# Patient Record
Sex: FEMALE | Race: WHITE
[De-identification: names, ages, dates, MRNs, and addresses within clinical notes are randomized per-mention and may not be internally consistent; named-entity substitution may affect disease eponyms.]

---

## 2017-01-05 ENCOUNTER — HOSPITAL ENCOUNTER (OUTPATIENT)
Dept: HOSPITAL 83 - CARD | Age: 77
Discharge: HOME | End: 2017-01-05
Attending: FAMILY MEDICINE
Payer: MEDICARE

## 2017-01-05 DIAGNOSIS — R01.1: ICD-10-CM

## 2017-01-05 DIAGNOSIS — I05.0: ICD-10-CM

## 2017-01-05 DIAGNOSIS — I34.0: Primary | ICD-10-CM

## 2017-01-05 DIAGNOSIS — R06.02: ICD-10-CM

## 2017-07-21 ENCOUNTER — HOSPITAL ENCOUNTER (OUTPATIENT)
Dept: HOSPITAL 83 - SDC | Age: 77
Discharge: HOME | End: 2017-07-21
Attending: SURGERY
Payer: MEDICARE

## 2017-07-21 VITALS — SYSTOLIC BLOOD PRESSURE: 119 MMHG | DIASTOLIC BLOOD PRESSURE: 59 MMHG

## 2017-07-21 VITALS — WEIGHT: 167 LBS | HEIGHT: 72 IN | BODY MASS INDEX: 22.62 KG/M2

## 2017-07-21 VITALS — DIASTOLIC BLOOD PRESSURE: 58 MMHG

## 2017-07-21 VITALS — DIASTOLIC BLOOD PRESSURE: 87 MMHG | SYSTOLIC BLOOD PRESSURE: 141 MMHG

## 2017-07-21 VITALS — SYSTOLIC BLOOD PRESSURE: 120 MMHG | DIASTOLIC BLOOD PRESSURE: 68 MMHG

## 2017-07-21 VITALS — SYSTOLIC BLOOD PRESSURE: 126 MMHG | DIASTOLIC BLOOD PRESSURE: 86 MMHG

## 2017-07-21 VITALS — DIASTOLIC BLOOD PRESSURE: 63 MMHG

## 2017-07-21 DIAGNOSIS — Z98.890: ICD-10-CM

## 2017-07-21 DIAGNOSIS — Z90.710: ICD-10-CM

## 2017-07-21 DIAGNOSIS — K57.30: ICD-10-CM

## 2017-07-21 DIAGNOSIS — J44.9: ICD-10-CM

## 2017-07-21 DIAGNOSIS — Z87.891: ICD-10-CM

## 2017-07-21 DIAGNOSIS — Z09: Primary | ICD-10-CM

## 2017-07-21 DIAGNOSIS — Z86.010: ICD-10-CM

## 2017-07-21 PROCEDURE — 00810: CPT

## 2017-09-28 ENCOUNTER — HOSPITAL ENCOUNTER (EMERGENCY)
Dept: HOSPITAL 83 - ED | Age: 77
Discharge: HOME | End: 2017-09-28
Payer: MEDICARE

## 2017-09-28 VITALS — WEIGHT: 163 LBS | HEIGHT: 62.99 IN | BODY MASS INDEX: 28.88 KG/M2

## 2017-09-28 DIAGNOSIS — Z87.891: ICD-10-CM

## 2017-09-28 DIAGNOSIS — Z91.040: ICD-10-CM

## 2017-09-28 DIAGNOSIS — Z88.7: ICD-10-CM

## 2017-09-28 DIAGNOSIS — R33.9: Primary | ICD-10-CM

## 2017-09-28 DIAGNOSIS — Z79.899: ICD-10-CM

## 2017-12-04 ENCOUNTER — HOSPITAL ENCOUNTER (OUTPATIENT)
Dept: HOSPITAL 83 - RAD | Age: 77
Discharge: HOME | End: 2017-12-04
Attending: FAMILY MEDICINE
Payer: MEDICARE

## 2017-12-04 DIAGNOSIS — J44.9: Primary | ICD-10-CM

## 2017-12-04 DIAGNOSIS — I10: ICD-10-CM

## 2018-02-09 ENCOUNTER — HOSPITAL ENCOUNTER (OUTPATIENT)
Dept: HOSPITAL 83 - RAD | Age: 78
Discharge: HOME | End: 2018-02-09
Attending: FAMILY MEDICINE
Payer: MEDICARE

## 2018-02-09 DIAGNOSIS — R00.0: Primary | ICD-10-CM

## 2018-02-09 DIAGNOSIS — R05: ICD-10-CM

## 2018-02-09 DIAGNOSIS — R06.02: ICD-10-CM

## 2018-03-14 ENCOUNTER — HOSPITAL ENCOUNTER (OUTPATIENT)
Dept: HOSPITAL 83 - CP | Age: 78
Discharge: HOME | End: 2018-03-14
Attending: FAMILY MEDICINE
Payer: MEDICARE

## 2018-03-14 DIAGNOSIS — J42: Primary | ICD-10-CM

## 2018-07-24 ENCOUNTER — HOSPITAL ENCOUNTER (EMERGENCY)
Dept: HOSPITAL 83 - ED | Age: 78
Discharge: LEFT BEFORE DISCHARGE | End: 2018-07-24
Payer: MEDICARE

## 2018-07-24 VITALS — WEIGHT: 160 LBS | HEIGHT: 65.98 IN | BODY MASS INDEX: 25.71 KG/M2

## 2018-07-24 DIAGNOSIS — Z87.891: ICD-10-CM

## 2018-07-24 DIAGNOSIS — S00.03XA: ICD-10-CM

## 2018-07-24 DIAGNOSIS — Z79.899: ICD-10-CM

## 2018-07-24 DIAGNOSIS — S51.811A: Primary | ICD-10-CM

## 2018-07-24 DIAGNOSIS — R55: ICD-10-CM
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
## 2018-07-24 DIAGNOSIS — M19.90: ICD-10-CM

## 2018-07-24 DIAGNOSIS — N39.0: ICD-10-CM

## 2018-07-24 DIAGNOSIS — Z88.7: ICD-10-CM

## 2018-07-24 DIAGNOSIS — E66.9: ICD-10-CM

## 2018-07-24 DIAGNOSIS — Z91.040: ICD-10-CM

## 2018-07-24 DIAGNOSIS — J44.9: ICD-10-CM

## 2018-07-24 DIAGNOSIS — K21.9: ICD-10-CM
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
## 2018-07-24 DIAGNOSIS — Z86.73: ICD-10-CM

## 2018-07-24 DIAGNOSIS — I10: ICD-10-CM
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
## 2018-07-24 LAB
ALBUMIN SERPL-MCNC: 4.1 GM/DL (ref 3.1–4.5)
ALP SERPL-CCNC: 112 U/L (ref 45–117)
ALT SERPL W P-5'-P-CCNC: 29 U/L (ref 12–78)
APPEARANCE UR: (no result)
APTT PPP: 23.2 SECONDS (ref 20.8–31.5)
AST SERPL-CCNC: 36 IU/L (ref 3–35)
BACTERIA #/AREA URNS HPF: (no result) /[HPF]
BASOPHILS # BLD AUTO: 0.1 10*3/UL (ref 0–0.1)
BASOPHILS NFR BLD AUTO: 0.8 % (ref 0–1)
BILIRUB UR QL STRIP: NEGATIVE
BUN SERPL-MCNC: 24 MG/DL (ref 7–24)
CHLORIDE SERPL-SCNC: 110 MMOL/L (ref 98–107)
CK SERPL-CCNC: 590 U/L (ref 26–192)
COLOR UR: YELLOW
CREAT SERPL-MCNC: 1.1 MG/DL (ref 0.55–1.02)
EOSINOPHIL # BLD AUTO: 0.2 10*3/UL (ref 0–0.4)
EOSINOPHIL # BLD AUTO: 2.1 % (ref 1–4)
EPI CELLS #/AREA URNS HPF: (no result) /[HPF]
ERYTHROCYTE [DISTWIDTH] IN BLOOD BY AUTOMATED COUNT: 17.2 % (ref 0–14.5)
GLUCOSE UR QL: NEGATIVE
HCT VFR BLD AUTO: 41.9 % (ref 37–47)
HGB BLD-MCNC: 12.9 G/DL (ref 12–16)
HGB UR QL STRIP: (no result)
INR BLD: 1 (ref 2–3.5)
KETONES UR QL STRIP: (no result)
LEUKOCYTE ESTERASE UR QL STRIP: (no result)
LIPASE SERPL-CCNC: 118 U/L (ref 73–393)
LYMPHOCYTES # BLD AUTO: 1.5 10*3/UL (ref 1.3–4.4)
LYMPHOCYTES NFR BLD AUTO: 17.1 % (ref 27–41)
MCH RBC QN AUTO: 26.9 PG (ref 27–31)
MCHC RBC AUTO-ENTMCNC: 30.8 G/DL (ref 33–37)
MCV RBC AUTO: 87.5 FL (ref 81–99)
MONOCYTES # BLD AUTO: 0.7 10*3/UL (ref 0.1–1)
MONOCYTES NFR BLD MANUAL: 7.9 % (ref 3–9)
MYOGLOBIN SERPL-MCNC: 217 NG/ML (ref 13–71)
NEUT #: 6.4 10*3/UL (ref 2.3–7.9)
NEUT %: 71.9 % (ref 47–73)
NITRITE UR QL STRIP: POSITIVE
NRBC BLD QL AUTO: 0 10*3/UL (ref 0–0)
PH UR STRIP: 6.5 [PH] (ref 5–9)
PHOSPHATE SERPL-MCNC: 4 MG/DL (ref 2.5–4.9)
PLATELET # BLD AUTO: 262 10*3/UL (ref 130–400)
PMV BLD AUTO: 9.7 FL (ref 9.6–12.3)
POTASSIUM SERPL-SCNC: 3.8 MMOL/L (ref 3.5–5.1)
PROT SERPL-MCNC: 7.6 GM/DL (ref 6.4–8.2)
RBC # BLD AUTO: 4.79 10*6/UL (ref 4.1–5.1)
RBC #/AREA URNS HPF: (no result) RBC/HPF (ref 0–2)
SODIUM SERPL-SCNC: 145 MMOL/L (ref 136–145)
SP GR UR: 1.02 (ref 1–1.03)
TROPONIN I SERPL-MCNC: 0.05 NG/ML (ref ?–0.04)
UROBILINOGEN UR STRIP-MCNC: 0.2 E.U./DL (ref 0.2–1)
WBC #/AREA URNS HPF: (no result) WBC/HPF (ref 0–5)
WBC NRBC COR # BLD AUTO: 8.9 10*3/UL (ref 4.8–10.8)

## 2018-07-25 ENCOUNTER — HOSPITAL ENCOUNTER (INPATIENT)
Dept: HOSPITAL 83 - ED | Age: 78
LOS: 5 days | Discharge: HOME HEALTH | DRG: 308 | End: 2018-07-30
Attending: INTERNAL MEDICINE | Admitting: INTERNAL MEDICINE
Payer: MEDICARE

## 2018-07-25 VITALS — DIASTOLIC BLOOD PRESSURE: 70 MMHG | SYSTOLIC BLOOD PRESSURE: 100 MMHG

## 2018-07-25 VITALS — WEIGHT: 150 LBS | HEIGHT: 65 IN | BODY MASS INDEX: 24.99 KG/M2

## 2018-07-25 VITALS — DIASTOLIC BLOOD PRESSURE: 70 MMHG

## 2018-07-25 VITALS — SYSTOLIC BLOOD PRESSURE: 114 MMHG | DIASTOLIC BLOOD PRESSURE: 63 MMHG

## 2018-07-25 VITALS — DIASTOLIC BLOOD PRESSURE: 60 MMHG

## 2018-07-25 VITALS — SYSTOLIC BLOOD PRESSURE: 105 MMHG | DIASTOLIC BLOOD PRESSURE: 60 MMHG

## 2018-07-25 VITALS — DIASTOLIC BLOOD PRESSURE: 61 MMHG

## 2018-07-25 VITALS — DIASTOLIC BLOOD PRESSURE: 78 MMHG

## 2018-07-25 VITALS — DIASTOLIC BLOOD PRESSURE: 76 MMHG

## 2018-07-25 VITALS — DIASTOLIC BLOOD PRESSURE: 68 MMHG | SYSTOLIC BLOOD PRESSURE: 92 MMHG

## 2018-07-25 VITALS — DIASTOLIC BLOOD PRESSURE: 54 MMHG | SYSTOLIC BLOOD PRESSURE: 80 MMHG

## 2018-07-25 VITALS — DIASTOLIC BLOOD PRESSURE: 64 MMHG

## 2018-07-25 VITALS — DIASTOLIC BLOOD PRESSURE: 69 MMHG

## 2018-07-25 VITALS — DIASTOLIC BLOOD PRESSURE: 70 MMHG | SYSTOLIC BLOOD PRESSURE: 106 MMHG

## 2018-07-25 VITALS — DIASTOLIC BLOOD PRESSURE: 63 MMHG

## 2018-07-25 VITALS — DIASTOLIC BLOOD PRESSURE: 68 MMHG

## 2018-07-25 DIAGNOSIS — Z79.899: ICD-10-CM

## 2018-07-25 DIAGNOSIS — K50.90: ICD-10-CM

## 2018-07-25 DIAGNOSIS — Z98.41: ICD-10-CM

## 2018-07-25 DIAGNOSIS — Z90.710: ICD-10-CM

## 2018-07-25 DIAGNOSIS — Z91.040: ICD-10-CM

## 2018-07-25 DIAGNOSIS — E87.6: ICD-10-CM

## 2018-07-25 DIAGNOSIS — Z90.721: ICD-10-CM

## 2018-07-25 DIAGNOSIS — I10: ICD-10-CM

## 2018-07-25 DIAGNOSIS — F32.9: ICD-10-CM

## 2018-07-25 DIAGNOSIS — Z79.01: ICD-10-CM

## 2018-07-25 DIAGNOSIS — Z81.8: ICD-10-CM

## 2018-07-25 DIAGNOSIS — Z71.6: ICD-10-CM

## 2018-07-25 DIAGNOSIS — Z98.42: ICD-10-CM

## 2018-07-25 DIAGNOSIS — Z83.6: ICD-10-CM

## 2018-07-25 DIAGNOSIS — I47.1: ICD-10-CM

## 2018-07-25 DIAGNOSIS — Z88.7: ICD-10-CM

## 2018-07-25 DIAGNOSIS — Z82.49: ICD-10-CM

## 2018-07-25 DIAGNOSIS — K21.9: ICD-10-CM

## 2018-07-25 DIAGNOSIS — N32.81: ICD-10-CM

## 2018-07-25 DIAGNOSIS — N17.0: ICD-10-CM

## 2018-07-25 DIAGNOSIS — I48.0: Primary | ICD-10-CM

## 2018-07-25 DIAGNOSIS — M19.90: ICD-10-CM

## 2018-07-25 DIAGNOSIS — Z66: ICD-10-CM

## 2018-07-25 DIAGNOSIS — N39.0: ICD-10-CM

## 2018-07-25 DIAGNOSIS — R74.8: ICD-10-CM

## 2018-07-25 DIAGNOSIS — J44.1: ICD-10-CM

## 2018-07-25 DIAGNOSIS — Z51.5: ICD-10-CM

## 2018-07-25 DIAGNOSIS — E66.9: ICD-10-CM

## 2018-07-25 DIAGNOSIS — I48.92: ICD-10-CM

## 2018-07-25 LAB
ALBUMIN SERPL-MCNC: 3.5 GM/DL (ref 3.1–4.5)
ALP SERPL-CCNC: 92 U/L (ref 45–117)
ALT SERPL W P-5'-P-CCNC: 29 U/L (ref 12–78)
APTT PPP: 23.5 SECONDS (ref 20.8–31.5)
AST SERPL-CCNC: 39 IU/L (ref 3–35)
BASOPHILS # BLD AUTO: 0.1 10*3/UL (ref 0–0.1)
BASOPHILS NFR BLD AUTO: 0.8 % (ref 0–1)
BUN SERPL-MCNC: 26 MG/DL (ref 7–24)
CHLORIDE SERPL-SCNC: 109 MMOL/L (ref 98–107)
CREAT SERPL-MCNC: 1.12 MG/DL (ref 0.55–1.02)
EOSINOPHIL # BLD AUTO: 0.1 10*3/UL (ref 0–0.4)
EOSINOPHIL # BLD AUTO: 1 % (ref 1–4)
ERYTHROCYTE [DISTWIDTH] IN BLOOD BY AUTOMATED COUNT: 17.4 % (ref 0–14.5)
HCT VFR BLD AUTO: 40.3 % (ref 37–47)
HGB BLD-MCNC: 12.5 G/DL (ref 12–16)
INR BLD: 1.1 (ref 2–3.5)
LYMPHOCYTES # BLD AUTO: 1.6 10*3/UL (ref 1.3–4.4)
LYMPHOCYTES NFR BLD AUTO: 19.7 % (ref 27–41)
MCH RBC QN AUTO: 27.2 PG (ref 27–31)
MCHC RBC AUTO-ENTMCNC: 31 G/DL (ref 33–37)
MCV RBC AUTO: 87.8 FL (ref 81–99)
MONOCYTES # BLD AUTO: 0.9 10*3/UL (ref 0.1–1)
MONOCYTES NFR BLD MANUAL: 10.4 % (ref 3–9)
NEUT #: 5.6 10*3/UL (ref 2.3–7.9)
NEUT %: 67.7 % (ref 47–73)
NRBC BLD QL AUTO: 0 % (ref 0–0)
PLATELET # BLD AUTO: 244 10*3/UL (ref 130–400)
PMV BLD AUTO: 9.5 FL (ref 9.6–12.3)
POTASSIUM SERPL-SCNC: 3.4 MMOL/L (ref 3.5–5.1)
PROT SERPL-MCNC: 7 GM/DL (ref 6.4–8.2)
RBC # BLD AUTO: 4.59 10*6/UL (ref 4.1–5.1)
SODIUM SERPL-SCNC: 144 MMOL/L (ref 136–145)
TROPONIN I SERPL-MCNC: 0.09 NG/ML (ref ?–0.04)
TSH SERPL DL<=0.005 MIU/L-ACNC: 2.27 UIU/ML (ref 0.36–4.75)
WBC NRBC COR # BLD AUTO: 8.3 10*3/UL (ref 4.8–10.8)

## 2018-07-26 VITALS — DIASTOLIC BLOOD PRESSURE: 66 MMHG | SYSTOLIC BLOOD PRESSURE: 109 MMHG

## 2018-07-26 VITALS — DIASTOLIC BLOOD PRESSURE: 81 MMHG | SYSTOLIC BLOOD PRESSURE: 138 MMHG

## 2018-07-26 VITALS — SYSTOLIC BLOOD PRESSURE: 94 MMHG | DIASTOLIC BLOOD PRESSURE: 55 MMHG

## 2018-07-26 VITALS — DIASTOLIC BLOOD PRESSURE: 92 MMHG

## 2018-07-26 VITALS — DIASTOLIC BLOOD PRESSURE: 78 MMHG

## 2018-07-26 VITALS — DIASTOLIC BLOOD PRESSURE: 79 MMHG | SYSTOLIC BLOOD PRESSURE: 116 MMHG

## 2018-07-26 LAB
25(OH)D3 SERPL-MCNC: 39.4 NG/ML (ref 30–100)
ALBUMIN SERPL-MCNC: 3.4 GM/DL (ref 3.1–4.5)
ALP SERPL-CCNC: 86 U/L (ref 45–117)
ALT SERPL W P-5'-P-CCNC: 32 U/L (ref 12–78)
APTT PPP: 23.4 SECONDS (ref 20.8–31.5)
AST SERPL-CCNC: 38 IU/L (ref 3–35)
BASOPHILS # BLD AUTO: 0.1 10*3/UL (ref 0–0.1)
BASOPHILS NFR BLD AUTO: 0.8 % (ref 0–1)
BUN SERPL-MCNC: 18 MG/DL (ref 7–24)
CHLORIDE SERPL-SCNC: 111 MMOL/L (ref 98–107)
CHOLEST SERPL-MCNC: 139 MG/DL (ref ?–200)
CREAT SERPL-MCNC: 0.82 MG/DL (ref 0.55–1.02)
EOSINOPHIL # BLD AUTO: 0.1 10*3/UL (ref 0–0.4)
EOSINOPHIL # BLD AUTO: 1.8 % (ref 1–4)
ERYTHROCYTE [DISTWIDTH] IN BLOOD BY AUTOMATED COUNT: 17.2 % (ref 0–14.5)
HCT VFR BLD AUTO: 42.6 % (ref 37–47)
HDLC SERPL-MCNC: 52 MG/DL (ref 40–60)
HGB BLD-MCNC: 12.8 G/DL (ref 12–16)
INR BLD: 1 (ref 2–3.5)
LDLC SERPL DIRECT ASSAY-MCNC: 70 MG/DL (ref 9–159)
LYMPHOCYTES # BLD AUTO: 2.5 10*3/UL (ref 1.3–4.4)
LYMPHOCYTES NFR BLD AUTO: 31 % (ref 27–41)
MCH RBC QN AUTO: 26.9 PG (ref 27–31)
MCHC RBC AUTO-ENTMCNC: 30 G/DL (ref 33–37)
MCV RBC AUTO: 89.5 FL (ref 81–99)
MONOCYTES # BLD AUTO: 0.9 10*3/UL (ref 0.1–1)
MONOCYTES NFR BLD MANUAL: 11.4 % (ref 3–9)
NEUT #: 4.4 10*3/UL (ref 2.3–7.9)
NEUT %: 54.9 % (ref 47–73)
NRBC BLD QL AUTO: 0 % (ref 0–0)
PHOSPHATE SERPL-MCNC: 2.5 MG/DL (ref 2.5–4.9)
PLATELET # BLD AUTO: 248 10*3/UL (ref 130–400)
PMV BLD AUTO: 9.6 FL (ref 9.6–12.3)
POTASSIUM SERPL-SCNC: 3 MMOL/L (ref 3.5–5.1)
PROT SERPL-MCNC: 6.8 GM/DL (ref 6.4–8.2)
RBC # BLD AUTO: 4.76 10*6/UL (ref 4.1–5.1)
SODIUM SERPL-SCNC: 146 MMOL/L (ref 136–145)
T4 FREE SERPL-MCNC: 0.92 NG/DL (ref 0.76–1.46)
TRIGL SERPL-MCNC: 87 MG/DL (ref ?–150)
TSH SERPL DL<=0.005 MIU/L-ACNC: 1.63 UIU/ML (ref 0.36–4.75)
VITAMIN B12: 290 PG/ML (ref 247–911)
VLDLC SERPL CALC-MCNC: 17 MG/DL (ref 6–40)
WBC NRBC COR # BLD AUTO: 7.9 10*3/UL (ref 4.8–10.8)

## 2018-07-27 VITALS — DIASTOLIC BLOOD PRESSURE: 76 MMHG

## 2018-07-27 VITALS — SYSTOLIC BLOOD PRESSURE: 99 MMHG | DIASTOLIC BLOOD PRESSURE: 60 MMHG

## 2018-07-27 VITALS — SYSTOLIC BLOOD PRESSURE: 106 MMHG | DIASTOLIC BLOOD PRESSURE: 59 MMHG

## 2018-07-27 VITALS — SYSTOLIC BLOOD PRESSURE: 110 MMHG | DIASTOLIC BLOOD PRESSURE: 62 MMHG

## 2018-07-27 VITALS — DIASTOLIC BLOOD PRESSURE: 74 MMHG

## 2018-07-27 LAB
BUN SERPL-MCNC: 14 MG/DL (ref 7–24)
CHLORIDE SERPL-SCNC: 110 MMOL/L (ref 98–107)
CREAT SERPL-MCNC: 0.78 MG/DL (ref 0.55–1.02)
POTASSIUM SERPL-SCNC: 3 MMOL/L (ref 3.5–5.1)
SODIUM SERPL-SCNC: 147 MMOL/L (ref 136–145)

## 2018-07-28 VITALS — SYSTOLIC BLOOD PRESSURE: 106 MMHG | DIASTOLIC BLOOD PRESSURE: 60 MMHG

## 2018-07-28 VITALS — SYSTOLIC BLOOD PRESSURE: 127 MMHG | DIASTOLIC BLOOD PRESSURE: 78 MMHG

## 2018-07-28 VITALS — SYSTOLIC BLOOD PRESSURE: 122 MMHG | DIASTOLIC BLOOD PRESSURE: 76 MMHG

## 2018-07-28 VITALS — DIASTOLIC BLOOD PRESSURE: 64 MMHG

## 2018-07-28 VITALS — DIASTOLIC BLOOD PRESSURE: 68 MMHG

## 2018-07-28 LAB
ALBUMIN SERPL-MCNC: 2.9 GM/DL (ref 3.1–4.5)
ALP SERPL-CCNC: 77 U/L (ref 45–117)
ALT SERPL W P-5'-P-CCNC: 23 U/L (ref 12–78)
APPEARANCE UR: (no result)
AST SERPL-CCNC: 19 IU/L (ref 3–35)
BACTERIA #/AREA URNS HPF: (no result) /[HPF]
BASOPHILS # BLD AUTO: 0 10*3/UL (ref 0–0.1)
BASOPHILS NFR BLD AUTO: 0.6 % (ref 0–1)
BILIRUB UR QL STRIP: NEGATIVE
BUN SERPL-MCNC: 14 MG/DL (ref 7–24)
CHLORIDE SERPL-SCNC: 109 MMOL/L (ref 98–107)
COLOR UR: YELLOW
CREAT SERPL-MCNC: 0.75 MG/DL (ref 0.55–1.02)
EOSINOPHIL # BLD AUTO: 0.2 10*3/UL (ref 0–0.4)
EOSINOPHIL # BLD AUTO: 3 % (ref 1–4)
EPI CELLS #/AREA URNS HPF: (no result) /[HPF]
ERYTHROCYTE [DISTWIDTH] IN BLOOD BY AUTOMATED COUNT: 17.2 % (ref 0–14.5)
GLUCOSE UR QL: NEGATIVE
HCT VFR BLD AUTO: 39.9 % (ref 37–47)
HGB BLD-MCNC: 11.8 G/DL (ref 12–16)
HGB UR QL STRIP: NEGATIVE
KETONES UR QL STRIP: NEGATIVE
LEUKOCYTE ESTERASE UR QL STRIP: NEGATIVE
LYMPHOCYTES # BLD AUTO: 2.1 10*3/UL (ref 1.3–4.4)
LYMPHOCYTES NFR BLD AUTO: 29.2 % (ref 27–41)
MCH RBC QN AUTO: 26.3 PG (ref 27–31)
MCHC RBC AUTO-ENTMCNC: 29.6 G/DL (ref 33–37)
MCV RBC AUTO: 89.1 FL (ref 81–99)
MONOCYTES # BLD AUTO: 0.9 10*3/UL (ref 0.1–1)
MONOCYTES NFR BLD MANUAL: 12.3 % (ref 3–9)
NEUT #: 3.8 10*3/UL (ref 2.3–7.9)
NEUT %: 54.6 % (ref 47–73)
NITRITE UR QL STRIP: NEGATIVE
NRBC BLD QL AUTO: 0 10*3/UL (ref 0–0)
PH UR STRIP: 5.5 [PH] (ref 5–9)
PLATELET # BLD AUTO: 218 10*3/UL (ref 130–400)
PMV BLD AUTO: 9.4 FL (ref 9.6–12.3)
POTASSIUM SERPL-SCNC: 4 MMOL/L (ref 3.5–5.1)
PROT SERPL-MCNC: 6 GM/DL (ref 6.4–8.2)
RBC # BLD AUTO: 4.48 10*6/UL (ref 4.1–5.1)
RBC #/AREA URNS HPF: (no result) RBC/HPF (ref 0–2)
SODIUM SERPL-SCNC: 145 MMOL/L (ref 136–145)
SP GR UR: 1.02 (ref 1–1.03)
UROBILINOGEN UR STRIP-MCNC: 0.2 E.U./DL (ref 0.2–1)
WBC #/AREA URNS HPF: (no result) WBC/HPF (ref 0–5)
WBC NRBC COR # BLD AUTO: 7 10*3/UL (ref 4.8–10.8)

## 2018-07-29 VITALS — SYSTOLIC BLOOD PRESSURE: 103 MMHG | DIASTOLIC BLOOD PRESSURE: 61 MMHG

## 2018-07-29 VITALS — SYSTOLIC BLOOD PRESSURE: 103 MMHG | DIASTOLIC BLOOD PRESSURE: 60 MMHG

## 2018-07-29 VITALS — DIASTOLIC BLOOD PRESSURE: 76 MMHG

## 2018-07-29 VITALS — DIASTOLIC BLOOD PRESSURE: 62 MMHG

## 2018-07-29 LAB
ALBUMIN SERPL-MCNC: 3 GM/DL (ref 3.1–4.5)
ALP SERPL-CCNC: 78 U/L (ref 45–117)
ALT SERPL W P-5'-P-CCNC: 23 U/L (ref 12–78)
AST SERPL-CCNC: 18 IU/L (ref 3–35)
BASOPHILS # BLD AUTO: 0.1 10*3/UL (ref 0–0.1)
BASOPHILS NFR BLD AUTO: 0.9 % (ref 0–1)
BUN SERPL-MCNC: 9 MG/DL (ref 7–24)
CHLORIDE SERPL-SCNC: 107 MMOL/L (ref 98–107)
CREAT SERPL-MCNC: 0.57 MG/DL (ref 0.55–1.02)
EOSINOPHIL # BLD AUTO: 0.2 10*3/UL (ref 0–0.4)
EOSINOPHIL # BLD AUTO: 2.8 % (ref 1–4)
ERYTHROCYTE [DISTWIDTH] IN BLOOD BY AUTOMATED COUNT: 17.2 % (ref 0–14.5)
HCT VFR BLD AUTO: 38.6 % (ref 37–47)
HGB BLD-MCNC: 11.6 G/DL (ref 12–16)
LYMPHOCYTES # BLD AUTO: 1.5 10*3/UL (ref 1.3–4.4)
LYMPHOCYTES NFR BLD AUTO: 21.7 % (ref 27–41)
MCH RBC QN AUTO: 26.8 PG (ref 27–31)
MCHC RBC AUTO-ENTMCNC: 30.1 G/DL (ref 33–37)
MCV RBC AUTO: 89.1 FL (ref 81–99)
MONOCYTES # BLD AUTO: 0.8 10*3/UL (ref 0.1–1)
MONOCYTES NFR BLD MANUAL: 11.4 % (ref 3–9)
NEUT #: 4.2 10*3/UL (ref 2.3–7.9)
NEUT %: 62.8 % (ref 47–73)
NRBC BLD QL AUTO: 0 % (ref 0–0)
PLATELET # BLD AUTO: 210 10*3/UL (ref 130–400)
PMV BLD AUTO: 9.6 FL (ref 9.6–12.3)
POTASSIUM SERPL-SCNC: 3.7 MMOL/L (ref 3.5–5.1)
PROT SERPL-MCNC: 6.3 GM/DL (ref 6.4–8.2)
RBC # BLD AUTO: 4.33 10*6/UL (ref 4.1–5.1)
SODIUM SERPL-SCNC: 144 MMOL/L (ref 136–145)
WBC NRBC COR # BLD AUTO: 6.8 10*3/UL (ref 4.8–10.8)

## 2018-07-30 VITALS — DIASTOLIC BLOOD PRESSURE: 90 MMHG

## 2018-07-30 VITALS — DIASTOLIC BLOOD PRESSURE: 64 MMHG

## 2018-07-30 VITALS — DIASTOLIC BLOOD PRESSURE: 60 MMHG | SYSTOLIC BLOOD PRESSURE: 120 MMHG

## 2018-07-30 VITALS — DIASTOLIC BLOOD PRESSURE: 61 MMHG

## 2018-07-30 VITALS — DIASTOLIC BLOOD PRESSURE: 70 MMHG

## 2018-07-30 LAB
ALBUMIN SERPL-MCNC: 2.7 GM/DL (ref 3.1–4.5)
ALP SERPL-CCNC: 76 U/L (ref 45–117)
ALT SERPL W P-5'-P-CCNC: 20 U/L (ref 12–78)
AST SERPL-CCNC: 14 IU/L (ref 3–35)
BASOPHILS # BLD AUTO: 0 10*3/UL (ref 0–0.1)
BASOPHILS NFR BLD AUTO: 0.4 % (ref 0–1)
BUN SERPL-MCNC: 17 MG/DL (ref 7–24)
CHLORIDE SERPL-SCNC: 103 MMOL/L (ref 98–107)
CREAT SERPL-MCNC: 0.82 MG/DL (ref 0.55–1.02)
EOSINOPHIL # BLD AUTO: 0 % (ref 1–4)
EOSINOPHIL # BLD AUTO: 0 10*3/UL (ref 0–0.4)
ERYTHROCYTE [DISTWIDTH] IN BLOOD BY AUTOMATED COUNT: 16.9 % (ref 0–14.5)
HCT VFR BLD AUTO: 38.3 % (ref 37–47)
HGB BLD-MCNC: 11.6 G/DL (ref 12–16)
LYMPHOCYTES # BLD AUTO: 0.5 10*3/UL (ref 1.3–4.4)
LYMPHOCYTES NFR BLD AUTO: 9.3 % (ref 27–41)
MCH RBC QN AUTO: 26.8 PG (ref 27–31)
MCHC RBC AUTO-ENTMCNC: 30.3 G/DL (ref 33–37)
MCV RBC AUTO: 88.5 FL (ref 81–99)
MONOCYTES # BLD AUTO: 0.1 10*3/UL (ref 0.1–1)
MONOCYTES NFR BLD MANUAL: 1.2 % (ref 3–9)
NEUT #: 4.5 10*3/UL (ref 2.3–7.9)
NEUT %: 88.7 % (ref 47–73)
NRBC BLD QL AUTO: 0 10*3/UL (ref 0–0)
PLATELET # BLD AUTO: 217 10*3/UL (ref 130–400)
PMV BLD AUTO: 10.2 FL (ref 9.6–12.3)
POTASSIUM SERPL-SCNC: 4.6 MMOL/L (ref 3.5–5.1)
PROT SERPL-MCNC: 6.1 GM/DL (ref 6.4–8.2)
RBC # BLD AUTO: 4.33 10*6/UL (ref 4.1–5.1)
SODIUM SERPL-SCNC: 141 MMOL/L (ref 136–145)
WBC NRBC COR # BLD AUTO: 5.1 10*3/UL (ref 4.8–10.8)

## 2018-09-13 ENCOUNTER — HOSPITAL ENCOUNTER (OUTPATIENT)
Dept: HOSPITAL 83 - RESCLI | Age: 78
Discharge: HOME | End: 2018-09-13
Attending: INTERNAL MEDICINE
Payer: MEDICARE

## 2018-09-13 DIAGNOSIS — F32.9: ICD-10-CM

## 2018-09-13 DIAGNOSIS — R42: ICD-10-CM

## 2018-09-13 DIAGNOSIS — G25.81: ICD-10-CM

## 2018-09-13 DIAGNOSIS — J30.2: ICD-10-CM

## 2018-09-13 DIAGNOSIS — J96.11: ICD-10-CM

## 2018-09-13 DIAGNOSIS — G47.00: ICD-10-CM

## 2018-09-13 DIAGNOSIS — K21.9: ICD-10-CM

## 2018-09-13 DIAGNOSIS — I71.9: ICD-10-CM

## 2018-09-13 DIAGNOSIS — I10: ICD-10-CM

## 2018-09-13 DIAGNOSIS — Z79.899: ICD-10-CM

## 2018-09-13 DIAGNOSIS — E66.3: ICD-10-CM

## 2018-09-13 DIAGNOSIS — Z91.040: ICD-10-CM

## 2018-09-13 DIAGNOSIS — E55.9: ICD-10-CM

## 2018-09-13 DIAGNOSIS — Z99.81: ICD-10-CM

## 2018-09-13 DIAGNOSIS — I48.2: Primary | ICD-10-CM

## 2018-09-13 DIAGNOSIS — J44.9: ICD-10-CM

## 2018-11-15 ENCOUNTER — OFFICE VISIT (OUTPATIENT)
Dept: CARDIOLOGY CLINIC | Age: 78
End: 2018-11-15
Payer: MEDICARE

## 2018-11-15 VITALS
OXYGEN SATURATION: 92 % | BODY MASS INDEX: 30.48 KG/M2 | WEIGHT: 172 LBS | DIASTOLIC BLOOD PRESSURE: 74 MMHG | RESPIRATION RATE: 12 BRPM | SYSTOLIC BLOOD PRESSURE: 120 MMHG | HEART RATE: 80 BPM | HEIGHT: 63 IN

## 2018-11-15 DIAGNOSIS — I48.20 CHRONIC ATRIAL FIBRILLATION (HCC): Primary | ICD-10-CM

## 2018-11-15 PROCEDURE — G8427 DOCREV CUR MEDS BY ELIG CLIN: HCPCS | Performed by: INTERNAL MEDICINE

## 2018-11-15 PROCEDURE — 99214 OFFICE O/P EST MOD 30 MIN: CPT | Performed by: INTERNAL MEDICINE

## 2018-11-15 PROCEDURE — 1101F PT FALLS ASSESS-DOCD LE1/YR: CPT | Performed by: INTERNAL MEDICINE

## 2018-11-15 PROCEDURE — 1123F ACP DISCUSS/DSCN MKR DOCD: CPT | Performed by: INTERNAL MEDICINE

## 2018-11-15 PROCEDURE — 1036F TOBACCO NON-USER: CPT | Performed by: INTERNAL MEDICINE

## 2018-11-15 PROCEDURE — G8400 PT W/DXA NO RESULTS DOC: HCPCS | Performed by: INTERNAL MEDICINE

## 2018-11-15 PROCEDURE — G8417 CALC BMI ABV UP PARAM F/U: HCPCS | Performed by: INTERNAL MEDICINE

## 2018-11-15 PROCEDURE — G8484 FLU IMMUNIZE NO ADMIN: HCPCS | Performed by: INTERNAL MEDICINE

## 2018-11-15 PROCEDURE — 4040F PNEUMOC VAC/ADMIN/RCVD: CPT | Performed by: INTERNAL MEDICINE

## 2018-11-15 PROCEDURE — 1090F PRES/ABSN URINE INCON ASSESS: CPT | Performed by: INTERNAL MEDICINE

## 2018-11-15 PROCEDURE — 93000 ELECTROCARDIOGRAM COMPLETE: CPT | Performed by: INTERNAL MEDICINE

## 2018-11-15 RX ORDER — ALBUTEROL SULFATE 90 UG/1
2 AEROSOL, METERED RESPIRATORY (INHALATION) EVERY 6 HOURS PRN
COMMUNITY

## 2018-11-15 RX ORDER — FLUTICASONE PROPIONATE 50 MCG
1 SPRAY, SUSPENSION (ML) NASAL 2 TIMES DAILY
COMMUNITY

## 2018-11-15 RX ORDER — VERAPAMIL HYDROCHLORIDE 120 MG/1
120 CAPSULE, EXTENDED RELEASE ORAL 3 TIMES DAILY
COMMUNITY

## 2018-11-15 RX ORDER — POTASSIUM CHLORIDE 1.5 G/1.77G
20 POWDER, FOR SOLUTION ORAL 2 TIMES DAILY
COMMUNITY

## 2018-11-15 RX ORDER — PANTOPRAZOLE SODIUM 40 MG/1
40 TABLET, DELAYED RELEASE ORAL DAILY
COMMUNITY

## 2018-11-15 RX ORDER — TRAZODONE HYDROCHLORIDE 100 MG/1
100 TABLET ORAL NIGHTLY
COMMUNITY

## 2018-11-15 RX ORDER — LISINOPRIL 5 MG/1
5 TABLET ORAL DAILY
COMMUNITY

## 2018-11-15 RX ORDER — ROPINIROLE 1 MG/1
1 TABLET, FILM COATED ORAL 2 TIMES DAILY
COMMUNITY

## 2018-11-15 RX ORDER — BENZONATATE 100 MG/1
100 CAPSULE ORAL 3 TIMES DAILY PRN
COMMUNITY

## 2018-11-15 RX ORDER — SERTRALINE HYDROCHLORIDE 100 MG/1
100 TABLET, FILM COATED ORAL
COMMUNITY

## 2018-11-15 RX ORDER — HYDROXYZINE HYDROCHLORIDE 25 MG/1
25 TABLET, FILM COATED ORAL PRN
COMMUNITY

## 2018-11-16 NOTE — PROGRESS NOTES
Formerly [facility] Cardiology  MD Maryjo Fox Hervey Hong, MD Laurice Mais, M.D. Sergo Sommer   1940  LinhDO Adeline Hancock Zaheer Moore was seen in our [facility] Cardiology office today, 11/15/2018, for follow up of her paroxysmal atrial fibrillation. She is a 16-year-old woman who does have a history of atypical chest pain. She has been evaluated on multiple episodes in the past and her most recent stress test on 05/03/2016 showed no evidence for ischemia. We saw her when she was admitted to the hospital in 07/2018 with syncopal episodes proceeded by palpitations and lightheadedness. She was found to have paroxysmal tachycardia that was felt to be atrial fibrillation. She did admit that she had been diagnosed as having paroxysmal atrial fibrillation while she was residing in [place] and had been on a direct oral anticoagulant for a while at the time, but had stopped taking the pills when they ran out. When we saw her in 07/2018, we resumed Xarelto. We also started her on Verapamil for SVT/atrial fibrillation in the setting of severe obstructive lung disease. Since I last saw her, she states that she has been stable. She has not had any syncopal episodes or focal neurologic complaints. She denies palpitations. She does have chronic dyspnea and cough. She is known to have severe obstructive lung disease and is on home oxygen. She does admit that despite her bad lungs, she continues to smoke at least a half a pack of cigarettes a day. Past history includes:   1. Essential hypertension. 2. Obstructive lung disease. 3. Long term and ongoing cigarette abuse. Patient claims that she smokes about 1/2 pack of cigarettes a day. 4. History of Crohn's disease. 5. Atypical chest pain - patient states that she has had catheterization in the past, which was reportedly unremarkable.   A pharmacological stress

## 2018-11-29 ENCOUNTER — HOSPITAL ENCOUNTER (INPATIENT)
Dept: HOSPITAL 83 - ED | Age: 78
LOS: 7 days | Discharge: HOME | DRG: 871 | End: 2018-12-06
Attending: INTERNAL MEDICINE | Admitting: INTERNAL MEDICINE
Payer: MEDICARE

## 2018-11-29 VITALS — DIASTOLIC BLOOD PRESSURE: 76 MMHG | SYSTOLIC BLOOD PRESSURE: 109 MMHG

## 2018-11-29 VITALS
WEIGHT: 159.37 LBS | SYSTOLIC BLOOD PRESSURE: 148 MMHG | DIASTOLIC BLOOD PRESSURE: 84 MMHG | HEIGHT: 63 IN | BODY MASS INDEX: 28.24 KG/M2

## 2018-11-29 VITALS — DIASTOLIC BLOOD PRESSURE: 88 MMHG

## 2018-11-29 VITALS — DIASTOLIC BLOOD PRESSURE: 77 MMHG | SYSTOLIC BLOOD PRESSURE: 136 MMHG

## 2018-11-29 VITALS — SYSTOLIC BLOOD PRESSURE: 144 MMHG | DIASTOLIC BLOOD PRESSURE: 90 MMHG

## 2018-11-29 VITALS — DIASTOLIC BLOOD PRESSURE: 65 MMHG

## 2018-11-29 DIAGNOSIS — F17.210: ICD-10-CM

## 2018-11-29 DIAGNOSIS — N39.0: ICD-10-CM

## 2018-11-29 DIAGNOSIS — I45.9: ICD-10-CM

## 2018-11-29 DIAGNOSIS — J44.0: ICD-10-CM

## 2018-11-29 DIAGNOSIS — Z90.710: ICD-10-CM

## 2018-11-29 DIAGNOSIS — Z98.42: ICD-10-CM

## 2018-11-29 DIAGNOSIS — J44.1: ICD-10-CM

## 2018-11-29 DIAGNOSIS — M19.90: ICD-10-CM

## 2018-11-29 DIAGNOSIS — E87.8: ICD-10-CM

## 2018-11-29 DIAGNOSIS — Z91.040: ICD-10-CM

## 2018-11-29 DIAGNOSIS — Z82.49: ICD-10-CM

## 2018-11-29 DIAGNOSIS — F32.9: ICD-10-CM

## 2018-11-29 DIAGNOSIS — I48.92: ICD-10-CM

## 2018-11-29 DIAGNOSIS — Z79.01: ICD-10-CM

## 2018-11-29 DIAGNOSIS — J96.21: ICD-10-CM

## 2018-11-29 DIAGNOSIS — R74.0: ICD-10-CM

## 2018-11-29 DIAGNOSIS — Z79.899: ICD-10-CM

## 2018-11-29 DIAGNOSIS — K58.0: ICD-10-CM

## 2018-11-29 DIAGNOSIS — J18.9: ICD-10-CM

## 2018-11-29 DIAGNOSIS — Z86.73: ICD-10-CM

## 2018-11-29 DIAGNOSIS — I47.1: ICD-10-CM

## 2018-11-29 DIAGNOSIS — I48.0: ICD-10-CM

## 2018-11-29 DIAGNOSIS — I49.3: ICD-10-CM

## 2018-11-29 DIAGNOSIS — J30.2: ICD-10-CM

## 2018-11-29 DIAGNOSIS — Z90.721: ICD-10-CM

## 2018-11-29 DIAGNOSIS — Z88.7: ICD-10-CM

## 2018-11-29 DIAGNOSIS — Z98.41: ICD-10-CM

## 2018-11-29 DIAGNOSIS — K21.9: ICD-10-CM

## 2018-11-29 DIAGNOSIS — A41.9: Primary | ICD-10-CM

## 2018-11-29 DIAGNOSIS — D64.9: ICD-10-CM

## 2018-11-29 DIAGNOSIS — I10: ICD-10-CM

## 2018-11-29 DIAGNOSIS — Z99.81: ICD-10-CM

## 2018-11-29 DIAGNOSIS — I21.A1: ICD-10-CM

## 2018-11-29 LAB
ALBUMIN SERPL-MCNC: 3.5 GM/DL (ref 3.1–4.5)
ALP SERPL-CCNC: 78 U/L (ref 45–117)
ALT SERPL W P-5'-P-CCNC: 68 U/L (ref 12–78)
APPEARANCE UR: (no result)
APTT PPP: 26.6 SECONDS (ref 20.8–31.5)
AST SERPL-CCNC: 54 IU/L (ref 3–35)
BACTERIA #/AREA URNS HPF: (no result) /[HPF]
BASOPHILS # BLD AUTO: 0 10*3/UL (ref 0–0.1)
BASOPHILS NFR BLD AUTO: 0.4 % (ref 0–1)
BILIRUB UR QL STRIP: NEGATIVE
BUN SERPL-MCNC: 20 MG/DL (ref 7–24)
CASTS URNS QL MICRO: (no result)
CHLORIDE SERPL-SCNC: 112 MMOL/L (ref 98–107)
COLOR UR: YELLOW
CREAT SERPL-MCNC: 0.83 MG/DL (ref 0.55–1.02)
EOSINOPHIL # BLD AUTO: 0.1 10*3/UL (ref 0–0.4)
EOSINOPHIL # BLD AUTO: 1.9 % (ref 1–4)
EPI CELLS #/AREA URNS HPF: (no result) /[HPF]
ERYTHROCYTE [DISTWIDTH] IN BLOOD BY AUTOMATED COUNT: 17.3 % (ref 0–14.5)
GLUCOSE UR QL: NEGATIVE
HCT VFR BLD AUTO: 37.1 % (ref 37–47)
HGB BLD-MCNC: 10.9 G/DL (ref 12–16)
HGB UR QL STRIP: (no result)
INR BLD: 1.2 (ref 2–3.5)
KETONES UR QL STRIP: NEGATIVE
LEUKOCYTE ESTERASE UR QL STRIP: (no result)
LYMPHOCYTES # BLD AUTO: 1.1 10*3/UL (ref 1.3–4.4)
LYMPHOCYTES NFR BLD AUTO: 16.3 % (ref 27–41)
MCH RBC QN AUTO: 25.2 PG (ref 27–31)
MCHC RBC AUTO-ENTMCNC: 29.4 G/DL (ref 33–37)
MCV RBC AUTO: 85.7 FL (ref 81–99)
MONOCYTES # BLD AUTO: 0.6 10*3/UL (ref 0.1–1)
MONOCYTES NFR BLD MANUAL: 9.1 % (ref 3–9)
NEUT #: 4.9 10*3/UL (ref 2.3–7.9)
NEUT %: 72 % (ref 47–73)
NITRITE UR QL STRIP: NEGATIVE
NRBC BLD QL AUTO: 0 10*3/UL (ref 0–0)
PH UR STRIP: 5.5 [PH] (ref 5–9)
PLATELET # BLD AUTO: 289 10*3/UL (ref 130–400)
PMV BLD AUTO: 9.5 FL (ref 9.6–12.3)
POTASSIUM SERPL-SCNC: 4.2 MMOL/L (ref 3.5–5.1)
PROT SERPL-MCNC: 7.1 GM/DL (ref 6.4–8.2)
RBC # BLD AUTO: 4.33 10*6/UL (ref 4.1–5.1)
RBC #/AREA URNS HPF: (no result) RBC/HPF (ref 0–2)
SODIUM SERPL-SCNC: 147 MMOL/L (ref 136–145)
SP GR UR: >= 1.03 (ref 1–1.03)
TROPONIN I SERPL-MCNC: 0.1 NG/ML (ref ?–0.04)
UROBILINOGEN UR STRIP-MCNC: 0.2 E.U./DL (ref 0.2–1)
WBC #/AREA URNS HPF: (no result) WBC/HPF (ref 0–5)
WBC NRBC COR # BLD AUTO: 6.8 10*3/UL (ref 4.8–10.8)

## 2018-11-30 VITALS — SYSTOLIC BLOOD PRESSURE: 126 MMHG | DIASTOLIC BLOOD PRESSURE: 77 MMHG

## 2018-11-30 VITALS — DIASTOLIC BLOOD PRESSURE: 40 MMHG

## 2018-11-30 VITALS — DIASTOLIC BLOOD PRESSURE: 58 MMHG

## 2018-11-30 VITALS — DIASTOLIC BLOOD PRESSURE: 66 MMHG | SYSTOLIC BLOOD PRESSURE: 105 MMHG

## 2018-11-30 VITALS — DIASTOLIC BLOOD PRESSURE: 59 MMHG

## 2018-11-30 VITALS — DIASTOLIC BLOOD PRESSURE: 56 MMHG

## 2018-11-30 LAB
ALBUMIN SERPL-MCNC: 3.1 GM/DL (ref 3.1–4.5)
ALP SERPL-CCNC: 63 U/L (ref 45–117)
ALT SERPL W P-5'-P-CCNC: 66 U/L (ref 12–78)
AST SERPL-CCNC: 54 IU/L (ref 3–35)
BASOPHILS # BLD AUTO: 0 10*3/UL (ref 0–0.1)
BASOPHILS NFR BLD AUTO: 0 % (ref 0–1)
BUN SERPL-MCNC: 27 MG/DL (ref 7–24)
CHLORIDE SERPL-SCNC: 112 MMOL/L (ref 98–107)
CREAT SERPL-MCNC: 1.07 MG/DL (ref 0.55–1.02)
EOSINOPHIL # BLD AUTO: 0 % (ref 1–4)
EOSINOPHIL # BLD AUTO: 0 10*3/UL (ref 0–0.4)
ERYTHROCYTE [DISTWIDTH] IN BLOOD BY AUTOMATED COUNT: 17.4 % (ref 0–14.5)
HCT VFR BLD AUTO: 34.2 % (ref 37–47)
HGB BLD-MCNC: 10.1 G/DL (ref 12–16)
LV EF: 63 %
LVEF MODALITY: NORMAL
LYMPHOCYTES # BLD AUTO: 0.5 10*3/UL (ref 1.3–4.4)
LYMPHOCYTES NFR BLD AUTO: 12.9 % (ref 27–41)
MCH RBC QN AUTO: 25.3 PG (ref 27–31)
MCHC RBC AUTO-ENTMCNC: 29.5 G/DL (ref 33–37)
MCV RBC AUTO: 85.7 FL (ref 81–99)
MONOCYTES # BLD AUTO: 0.1 10*3/UL (ref 0.1–1)
MONOCYTES NFR BLD MANUAL: 1.9 % (ref 3–9)
NEUT #: 3.2 10*3/UL (ref 2.3–7.9)
NEUT %: 84.9 % (ref 47–73)
NRBC BLD QL AUTO: 0 % (ref 0–0)
PHOSPHATE SERPL-MCNC: 3.3 MG/DL (ref 2.5–4.9)
PLATELET # BLD AUTO: 253 10*3/UL (ref 130–400)
PMV BLD AUTO: 9.7 FL (ref 9.6–12.3)
POTASSIUM SERPL-SCNC: 4.8 MMOL/L (ref 3.5–5.1)
PROT SERPL-MCNC: 6.5 GM/DL (ref 6.4–8.2)
RBC # BLD AUTO: 3.99 10*6/UL (ref 4.1–5.1)
SODIUM SERPL-SCNC: 145 MMOL/L (ref 136–145)
T4 FREE SERPL-MCNC: 1 NG/DL (ref 0.76–1.46)
TSH SERPL DL<=0.005 MIU/L-ACNC: 0.48 UIU/ML (ref 0.36–4.75)
WBC NRBC COR # BLD AUTO: 3.7 10*3/UL (ref 4.8–10.8)

## 2018-12-01 VITALS — SYSTOLIC BLOOD PRESSURE: 129 MMHG | DIASTOLIC BLOOD PRESSURE: 79 MMHG

## 2018-12-01 VITALS — DIASTOLIC BLOOD PRESSURE: 60 MMHG | SYSTOLIC BLOOD PRESSURE: 119 MMHG

## 2018-12-01 VITALS — DIASTOLIC BLOOD PRESSURE: 88 MMHG

## 2018-12-01 VITALS — DIASTOLIC BLOOD PRESSURE: 75 MMHG

## 2018-12-01 VITALS — DIASTOLIC BLOOD PRESSURE: 68 MMHG

## 2018-12-01 VITALS — SYSTOLIC BLOOD PRESSURE: 138 MMHG | DIASTOLIC BLOOD PRESSURE: 88 MMHG

## 2018-12-01 LAB
ALBUMIN SERPL-MCNC: 2.9 GM/DL (ref 3.1–4.5)
ALBUMIN: 2.9 GM/DL (ref 3.1–4.5)
ALP BLD-CCNC: 49 U/L (ref 45–117)
ALP SERPL-CCNC: 49 U/L (ref 45–117)
ALT SERPL W P-5'-P-CCNC: 56 U/L (ref 12–78)
ALT SERPL-CCNC: 56 U/L (ref 12–78)
AST SERPL-CCNC: 46 IU/L (ref 3–35)
AST SERPL-CCNC: 46 IU/L (ref 3–35)
BASOPHILS # BLD AUTO: 0 10*3/UL (ref 0–0.1)
BASOPHILS NFR BLD AUTO: 0 % (ref 0–1)
BILIRUB SERPL-MCNC: 0.3 MG/DL (ref 0.2–1)
BUN BLDV-MCNC: 35 MG/DL (ref 7–24)
BUN SERPL-MCNC: 35 MG/DL (ref 7–24)
CALCIUM SERPL-MCNC: 7.9 MG/DL (ref 8.5–10.5)
CHLORIDE BLD-SCNC: 113 MMOL/L (ref 98–107)
CHLORIDE SERPL-SCNC: 113 MMOL/L (ref 98–107)
CO2: 26 MMOL/L (ref 21–32)
CREAT SERPL-MCNC: 0.98 MG/DL (ref 0.55–1.02)
CREAT SERPL-MCNC: 0.98 MG/DL (ref 0.55–1.02)
EOSINOPHIL # BLD AUTO: 0 % (ref 1–4)
EOSINOPHIL # BLD AUTO: 0 10*3/UL (ref 0–0.4)
ERYTHROCYTE [DISTWIDTH] IN BLOOD BY AUTOMATED COUNT: 17.4 % (ref 0–14.5)
GFR AFRICAN AMERICAN: > 60 ML/MIN
GFR SERPL CREATININE-BSD FRML MDRD: 55 ML/MIN/
GLUCOSE: 139 MG/DL (ref 65–99)
HCT VFR BLD AUTO: 32.7 % (ref 37–47)
HGB BLD-MCNC: 9.6 G/DL (ref 12–16)
LYMPHOCYTES # BLD AUTO: 0.6 10*3/UL (ref 1.3–4.4)
LYMPHOCYTES NFR BLD AUTO: 9.3 % (ref 27–41)
MCH RBC QN AUTO: 24.9 PG (ref 27–31)
MCHC RBC AUTO-ENTMCNC: 29.4 G/DL (ref 33–37)
MCV RBC AUTO: 84.7 FL (ref 81–99)
MONOCYTES # BLD AUTO: 0.2 10*3/UL (ref 0.1–1)
MONOCYTES NFR BLD MANUAL: 3 % (ref 3–9)
NEUT #: 5.2 10*3/UL (ref 2.3–7.9)
NEUT %: 87.2 % (ref 47–73)
NRBC BLD QL AUTO: 0 10*3/UL (ref 0–0)
PLATELET # BLD AUTO: 230 10*3/UL (ref 130–400)
PMV BLD AUTO: 9.7 FL (ref 9.6–12.3)
POTASSIUM SERPL-SCNC: 4.5 MMOL/L (ref 3.5–5.1)
POTASSIUM SERPL-SCNC: 4.5 MMOL/L (ref 3.5–5.1)
PROT SERPL-MCNC: 5.9 GM/DL (ref 6.4–8.2)
RBC # BLD AUTO: 3.86 10*6/UL (ref 4.1–5.1)
SODIUM BLD-SCNC: 148 MMOL/L (ref 136–145)
SODIUM SERPL-SCNC: 148 MMOL/L (ref 136–145)
TOTAL PROTEIN: 5.9 GM/DL (ref 6.4–8.2)
TROPONIN I SERPL-MCNC: 0.07 NG/ML (ref ?–0.04)
TROPONIN I: 0.07 NG/ML
WBC NRBC COR # BLD AUTO: 6 10*3/UL (ref 4.8–10.8)

## 2018-12-02 VITALS — SYSTOLIC BLOOD PRESSURE: 140 MMHG | DIASTOLIC BLOOD PRESSURE: 98 MMHG

## 2018-12-02 VITALS — SYSTOLIC BLOOD PRESSURE: 125 MMHG | DIASTOLIC BLOOD PRESSURE: 89 MMHG

## 2018-12-02 VITALS — SYSTOLIC BLOOD PRESSURE: 130 MMHG | DIASTOLIC BLOOD PRESSURE: 91 MMHG

## 2018-12-02 VITALS — DIASTOLIC BLOOD PRESSURE: 88 MMHG

## 2018-12-02 VITALS — DIASTOLIC BLOOD PRESSURE: 61 MMHG

## 2018-12-02 LAB
BASOPHILS # BLD AUTO: 0 10*3/UL (ref 0–0.1)
BASOPHILS NFR BLD AUTO: 0.1 % (ref 0–1)
BUN SERPL-MCNC: 34 MG/DL (ref 7–24)
CHLORIDE SERPL-SCNC: 110 MMOL/L (ref 98–107)
CREAT SERPL-MCNC: 0.78 MG/DL (ref 0.55–1.02)
EOSINOPHIL # BLD AUTO: 0 % (ref 1–4)
EOSINOPHIL # BLD AUTO: 0 10*3/UL (ref 0–0.4)
ERYTHROCYTE [DISTWIDTH] IN BLOOD BY AUTOMATED COUNT: 17.4 % (ref 0–14.5)
HCT VFR BLD AUTO: 33.6 % (ref 37–47)
HGB BLD-MCNC: 10.1 G/DL (ref 12–16)
LYMPHOCYTES # BLD AUTO: 0.4 10*3/UL (ref 1.3–4.4)
LYMPHOCYTES NFR BLD AUTO: 5.9 % (ref 27–41)
MCH RBC QN AUTO: 25.2 PG (ref 27–31)
MCHC RBC AUTO-ENTMCNC: 30.1 G/DL (ref 33–37)
MCV RBC AUTO: 83.8 FL (ref 81–99)
MONOCYTES # BLD AUTO: 0.3 10*3/UL (ref 0.1–1)
MONOCYTES NFR BLD MANUAL: 3.9 % (ref 3–9)
NEUT #: 6.1 10*3/UL (ref 2.3–7.9)
NEUT %: 89.7 % (ref 47–73)
NRBC BLD QL AUTO: 0.3 % (ref 0–0)
PLATELET # BLD AUTO: 235 10*3/UL (ref 130–400)
PMV BLD AUTO: 9.6 FL (ref 9.6–12.3)
POTASSIUM SERPL-SCNC: 3.8 MMOL/L (ref 3.5–5.1)
RBC # BLD AUTO: 4.01 10*6/UL (ref 4.1–5.1)
SODIUM SERPL-SCNC: 143 MMOL/L (ref 136–145)
WBC NRBC COR # BLD AUTO: 6.8 10*3/UL (ref 4.8–10.8)

## 2018-12-03 VITALS — SYSTOLIC BLOOD PRESSURE: 131 MMHG | DIASTOLIC BLOOD PRESSURE: 92 MMHG

## 2018-12-03 VITALS — DIASTOLIC BLOOD PRESSURE: 96 MMHG

## 2018-12-03 VITALS — DIASTOLIC BLOOD PRESSURE: 50 MMHG

## 2018-12-03 VITALS — DIASTOLIC BLOOD PRESSURE: 68 MMHG

## 2018-12-03 VITALS — SYSTOLIC BLOOD PRESSURE: 121 MMHG | DIASTOLIC BLOOD PRESSURE: 62 MMHG

## 2018-12-03 LAB
BASOPHILS # BLD AUTO: 0 10*3/UL (ref 0–0.1)
BASOPHILS NFR BLD AUTO: 0 % (ref 0–1)
BUN SERPL-MCNC: 27 MG/DL (ref 7–24)
CHLORIDE SERPL-SCNC: 109 MMOL/L (ref 98–107)
CREAT SERPL-MCNC: 0.8 MG/DL (ref 0.55–1.02)
EOSINOPHIL # BLD AUTO: 0 % (ref 1–4)
EOSINOPHIL # BLD AUTO: 0 10*3/UL (ref 0–0.4)
ERYTHROCYTE [DISTWIDTH] IN BLOOD BY AUTOMATED COUNT: 17.1 % (ref 0–14.5)
HCT VFR BLD AUTO: 36.2 % (ref 37–47)
HGB BLD-MCNC: 11 G/DL (ref 12–16)
LYMPHOCYTES # BLD AUTO: 0.5 10*3/UL (ref 1.3–4.4)
LYMPHOCYTES NFR BLD AUTO: 6.8 % (ref 27–41)
MCH RBC QN AUTO: 25.5 PG (ref 27–31)
MCHC RBC AUTO-ENTMCNC: 30.4 G/DL (ref 33–37)
MCV RBC AUTO: 83.8 FL (ref 81–99)
MONOCYTES # BLD AUTO: 0.5 10*3/UL (ref 0.1–1)
MONOCYTES NFR BLD MANUAL: 7.3 % (ref 3–9)
NEUT #: 6.1 10*3/UL (ref 2.3–7.9)
NEUT %: 85.5 % (ref 47–73)
NRBC BLD QL AUTO: 0 10*3/UL (ref 0–0)
PLATELET # BLD AUTO: 246 10*3/UL (ref 130–400)
PMV BLD AUTO: 10.1 FL (ref 9.6–12.3)
POTASSIUM SERPL-SCNC: 3.9 MMOL/L (ref 3.5–5.1)
RBC # BLD AUTO: 4.32 10*6/UL (ref 4.1–5.1)
SODIUM SERPL-SCNC: 145 MMOL/L (ref 136–145)
WBC NRBC COR # BLD AUTO: 7.1 10*3/UL (ref 4.8–10.8)

## 2018-12-04 VITALS — DIASTOLIC BLOOD PRESSURE: 92 MMHG

## 2018-12-04 VITALS — DIASTOLIC BLOOD PRESSURE: 41 MMHG

## 2018-12-04 VITALS — SYSTOLIC BLOOD PRESSURE: 110 MMHG | DIASTOLIC BLOOD PRESSURE: 76 MMHG

## 2018-12-04 VITALS — DIASTOLIC BLOOD PRESSURE: 90 MMHG

## 2018-12-04 VITALS — DIASTOLIC BLOOD PRESSURE: 77 MMHG

## 2018-12-04 LAB
ALBUMIN SERPL-MCNC: 3 GM/DL (ref 3.1–4.5)
ALP SERPL-CCNC: 58 U/L (ref 45–117)
ALT SERPL W P-5'-P-CCNC: 61 U/L (ref 12–78)
AST SERPL-CCNC: 41 IU/L (ref 3–35)
BASOPHILS # BLD AUTO: 0 10*3/UL (ref 0–0.1)
BASOPHILS NFR BLD AUTO: 0 % (ref 0–1)
BUN SERPL-MCNC: 22 MG/DL (ref 7–24)
CHLORIDE SERPL-SCNC: 107 MMOL/L (ref 98–107)
CREAT SERPL-MCNC: 0.74 MG/DL (ref 0.55–1.02)
EOSINOPHIL # BLD AUTO: 0 % (ref 1–4)
EOSINOPHIL # BLD AUTO: 0 10*3/UL (ref 0–0.4)
ERYTHROCYTE [DISTWIDTH] IN BLOOD BY AUTOMATED COUNT: 17.2 % (ref 0–14.5)
HCT VFR BLD AUTO: 37.7 % (ref 37–47)
HGB BLD-MCNC: 11.1 G/DL (ref 12–16)
LYMPHOCYTES # BLD AUTO: 0.7 10*3/UL (ref 1.3–4.4)
LYMPHOCYTES NFR BLD AUTO: 9 % (ref 27–41)
MCH RBC QN AUTO: 24.9 PG (ref 27–31)
MCHC RBC AUTO-ENTMCNC: 29.4 G/DL (ref 33–37)
MCV RBC AUTO: 84.5 FL (ref 81–99)
MONOCYTES # BLD AUTO: 0.5 10*3/UL (ref 0.1–1)
MONOCYTES NFR BLD MANUAL: 6.5 % (ref 3–9)
NEUT #: 6.3 10*3/UL (ref 2.3–7.9)
NEUT %: 84.1 % (ref 47–73)
NRBC BLD QL AUTO: 0 % (ref 0–0)
PLATELET # BLD AUTO: 250 10*3/UL (ref 130–400)
PMV BLD AUTO: 9.6 FL (ref 9.6–12.3)
POTASSIUM SERPL-SCNC: 3.8 MMOL/L (ref 3.5–5.1)
PROT SERPL-MCNC: 6.1 GM/DL (ref 6.4–8.2)
RBC # BLD AUTO: 4.46 10*6/UL (ref 4.1–5.1)
SODIUM SERPL-SCNC: 146 MMOL/L (ref 136–145)
WBC NRBC COR # BLD AUTO: 7.4 10*3/UL (ref 4.8–10.8)

## 2018-12-04 PROCEDURE — BD11YZZ FLUOROSCOPY OF ESOPHAGUS USING OTHER CONTRAST: ICD-10-PCS

## 2018-12-04 PROCEDURE — BD1BYZZ FLUOROSCOPY OF MOUTH/OROPHARYNX USING OTHER CONTRAST: ICD-10-PCS

## 2018-12-05 VITALS — DIASTOLIC BLOOD PRESSURE: 63 MMHG | SYSTOLIC BLOOD PRESSURE: 130 MMHG

## 2018-12-05 VITALS — DIASTOLIC BLOOD PRESSURE: 97 MMHG

## 2018-12-05 VITALS — DIASTOLIC BLOOD PRESSURE: 54 MMHG

## 2018-12-05 VITALS — SYSTOLIC BLOOD PRESSURE: 121 MMHG | DIASTOLIC BLOOD PRESSURE: 90 MMHG

## 2018-12-05 VITALS — DIASTOLIC BLOOD PRESSURE: 54 MMHG | SYSTOLIC BLOOD PRESSURE: 139 MMHG

## 2018-12-05 LAB
ALBUMIN SERPL-MCNC: 2.9 GM/DL (ref 3.1–4.5)
ALP SERPL-CCNC: 70 U/L (ref 45–117)
ALT SERPL W P-5'-P-CCNC: 60 U/L (ref 12–78)
AST SERPL-CCNC: 29 IU/L (ref 3–35)
BASOPHILS # BLD AUTO: 0 10*3/UL (ref 0–0.1)
BASOPHILS NFR BLD AUTO: 0 % (ref 0–1)
BUN SERPL-MCNC: 24 MG/DL (ref 7–24)
CHLORIDE SERPL-SCNC: 105 MMOL/L (ref 98–107)
CREAT SERPL-MCNC: 0.72 MG/DL (ref 0.55–1.02)
DIGOXIN SERPL-MCNC: 0.72 NG/ML (ref 0.8–2)
EOSINOPHIL # BLD AUTO: 0 % (ref 1–4)
EOSINOPHIL # BLD AUTO: 0 10*3/UL (ref 0–0.4)
ERYTHROCYTE [DISTWIDTH] IN BLOOD BY AUTOMATED COUNT: 17.2 % (ref 0–14.5)
HCT VFR BLD AUTO: 38.6 % (ref 37–47)
HGB BLD-MCNC: 11.5 G/DL (ref 12–16)
LYMPHOCYTES # BLD AUTO: 0.6 10*3/UL (ref 1.3–4.4)
LYMPHOCYTES NFR BLD AUTO: 8.1 % (ref 27–41)
MCH RBC QN AUTO: 25.4 PG (ref 27–31)
MCHC RBC AUTO-ENTMCNC: 29.8 G/DL (ref 33–37)
MCV RBC AUTO: 85.4 FL (ref 81–99)
MONOCYTES # BLD AUTO: 0.4 10*3/UL (ref 0.1–1)
MONOCYTES NFR BLD MANUAL: 6.2 % (ref 3–9)
NEUT #: 6.1 10*3/UL (ref 2.3–7.9)
NEUT %: 85.4 % (ref 47–73)
NRBC BLD QL AUTO: 0 10*3/UL (ref 0–0)
PLATELET # BLD AUTO: 251 10*3/UL (ref 130–400)
PMV BLD AUTO: 10.3 FL (ref 9.6–12.3)
POTASSIUM SERPL-SCNC: 3.6 MMOL/L (ref 3.5–5.1)
PROT SERPL-MCNC: 6.1 GM/DL (ref 6.4–8.2)
RBC # BLD AUTO: 4.52 10*6/UL (ref 4.1–5.1)
SODIUM SERPL-SCNC: 146 MMOL/L (ref 136–145)
WBC NRBC COR # BLD AUTO: 7.1 10*3/UL (ref 4.8–10.8)

## 2018-12-06 VITALS — DIASTOLIC BLOOD PRESSURE: 88 MMHG

## 2018-12-06 VITALS — DIASTOLIC BLOOD PRESSURE: 85 MMHG | SYSTOLIC BLOOD PRESSURE: 110 MMHG

## 2018-12-06 VITALS — DIASTOLIC BLOOD PRESSURE: 99 MMHG | SYSTOLIC BLOOD PRESSURE: 113 MMHG

## 2018-12-06 VITALS — SYSTOLIC BLOOD PRESSURE: 98 MMHG | DIASTOLIC BLOOD PRESSURE: 76 MMHG

## 2018-12-17 ENCOUNTER — TELEPHONE (OUTPATIENT)
Dept: CARDIOLOGY CLINIC | Age: 78
End: 2018-12-17

## 2018-12-20 LAB
BASE EXCESS ARTERIAL: 7.9 MMOL/L (ref -2–2)
BASE EXCESS: 9 MMOL/L (ref -2–2)
CO2 CONTENT ARTERIAL: 37.1 MMOL/L (ref 23–27)
HCO3 ARTERIAL: 35 MMOL/L (ref 22–26)
O2 SAT, ARTERIAL: 95.5 % (ref 95–97)
PCO2 ARTERIAL: 66.8 MMHG (ref 35–45)
PH ARTERIAL: 7.33 (ref 7.35–7.45)
PO2 ARTERIAL: 81 MMHG (ref 80–90)
TEMPERATURE: 97.6 F (ref 98–99)

## 2018-12-27 ENCOUNTER — HOSPITAL ENCOUNTER (OUTPATIENT)
Dept: HOSPITAL 83 - RESCLI | Age: 78
Discharge: HOME | End: 2018-12-27
Attending: INTERNAL MEDICINE
Payer: MEDICARE

## 2018-12-27 DIAGNOSIS — K21.9: ICD-10-CM

## 2018-12-27 DIAGNOSIS — E55.9: ICD-10-CM

## 2018-12-27 DIAGNOSIS — Z99.81: ICD-10-CM

## 2018-12-27 DIAGNOSIS — G25.81: ICD-10-CM

## 2018-12-27 DIAGNOSIS — F32.9: ICD-10-CM

## 2018-12-27 DIAGNOSIS — I71.9: ICD-10-CM

## 2018-12-27 DIAGNOSIS — J30.2: ICD-10-CM

## 2018-12-27 DIAGNOSIS — E66.3: ICD-10-CM

## 2018-12-27 DIAGNOSIS — I50.30: ICD-10-CM

## 2018-12-27 DIAGNOSIS — I48.2: ICD-10-CM

## 2018-12-27 DIAGNOSIS — Z09: Primary | ICD-10-CM

## 2018-12-27 DIAGNOSIS — J44.9: ICD-10-CM

## 2018-12-27 DIAGNOSIS — Z79.899: ICD-10-CM

## 2018-12-27 DIAGNOSIS — Z88.8: ICD-10-CM

## 2018-12-27 DIAGNOSIS — I11.0: ICD-10-CM

## 2018-12-27 DIAGNOSIS — J96.11: ICD-10-CM

## 2018-12-27 DIAGNOSIS — G47.00: ICD-10-CM

## 2018-12-27 DIAGNOSIS — Z91.040: ICD-10-CM

## 2019-01-23 ENCOUNTER — HOSPITAL ENCOUNTER (INPATIENT)
Dept: HOSPITAL 83 - ED | Age: 79
LOS: 2 days | Discharge: HOME HEALTH | DRG: 70 | End: 2019-01-25
Attending: INTERNAL MEDICINE | Admitting: INTERNAL MEDICINE
Payer: MEDICARE

## 2019-01-23 VITALS — HEIGHT: 63.58 IN | BODY MASS INDEX: 29.94 KG/M2 | WEIGHT: 171.13 LBS

## 2019-01-23 VITALS — SYSTOLIC BLOOD PRESSURE: 147 MMHG | DIASTOLIC BLOOD PRESSURE: 81 MMHG

## 2019-01-23 VITALS — SYSTOLIC BLOOD PRESSURE: 144 MMHG | DIASTOLIC BLOOD PRESSURE: 88 MMHG

## 2019-01-23 VITALS — DIASTOLIC BLOOD PRESSURE: 86 MMHG | SYSTOLIC BLOOD PRESSURE: 142 MMHG

## 2019-01-23 VITALS — SYSTOLIC BLOOD PRESSURE: 136 MMHG | DIASTOLIC BLOOD PRESSURE: 76 MMHG

## 2019-01-23 VITALS — SYSTOLIC BLOOD PRESSURE: 132 MMHG | DIASTOLIC BLOOD PRESSURE: 77 MMHG

## 2019-01-23 VITALS — SYSTOLIC BLOOD PRESSURE: 142 MMHG | DIASTOLIC BLOOD PRESSURE: 88 MMHG

## 2019-01-23 VITALS — DIASTOLIC BLOOD PRESSURE: 71 MMHG

## 2019-01-23 DIAGNOSIS — D72.818: ICD-10-CM

## 2019-01-23 DIAGNOSIS — Z90.49: ICD-10-CM

## 2019-01-23 DIAGNOSIS — I11.0: ICD-10-CM

## 2019-01-23 DIAGNOSIS — M19.90: ICD-10-CM

## 2019-01-23 DIAGNOSIS — N32.81: ICD-10-CM

## 2019-01-23 DIAGNOSIS — J44.0: ICD-10-CM

## 2019-01-23 DIAGNOSIS — Z91.040: ICD-10-CM

## 2019-01-23 DIAGNOSIS — E87.6: ICD-10-CM

## 2019-01-23 DIAGNOSIS — Z98.42: ICD-10-CM

## 2019-01-23 DIAGNOSIS — I50.9: ICD-10-CM

## 2019-01-23 DIAGNOSIS — Z99.81: ICD-10-CM

## 2019-01-23 DIAGNOSIS — J44.1: ICD-10-CM

## 2019-01-23 DIAGNOSIS — G93.41: Primary | ICD-10-CM

## 2019-01-23 DIAGNOSIS — K21.9: ICD-10-CM

## 2019-01-23 DIAGNOSIS — Z84.89: ICD-10-CM

## 2019-01-23 DIAGNOSIS — Z91.09: ICD-10-CM

## 2019-01-23 DIAGNOSIS — F32.9: ICD-10-CM

## 2019-01-23 DIAGNOSIS — J44.9: ICD-10-CM

## 2019-01-23 DIAGNOSIS — F17.210: ICD-10-CM

## 2019-01-23 DIAGNOSIS — E44.0: ICD-10-CM

## 2019-01-23 DIAGNOSIS — Z98.41: ICD-10-CM

## 2019-01-23 DIAGNOSIS — K58.9: ICD-10-CM

## 2019-01-23 DIAGNOSIS — Z90.710: ICD-10-CM

## 2019-01-23 DIAGNOSIS — J13: ICD-10-CM

## 2019-01-23 DIAGNOSIS — D64.9: ICD-10-CM

## 2019-01-23 LAB
ALBUMIN SERPL-MCNC: 3.3 GM/DL (ref 3.1–4.5)
ALP SERPL-CCNC: 86 U/L (ref 45–117)
ALT SERPL W P-5'-P-CCNC: 25 U/L (ref 12–78)
APPEARANCE UR: CLEAR
AST SERPL-CCNC: 25 IU/L (ref 3–35)
BASOPHILS # BLD AUTO: 0.1 10*3/UL (ref 0–0.1)
BASOPHILS NFR BLD AUTO: 0.6 % (ref 0–1)
BILIRUB UR QL STRIP: NEGATIVE
BUN SERPL-MCNC: 17 MG/DL (ref 7–24)
CHLORIDE SERPL-SCNC: 101 MMOL/L (ref 98–107)
COLOR UR: YELLOW
CREAT SERPL-MCNC: 0.94 MG/DL (ref 0.55–1.02)
EOSINOPHIL # BLD AUTO: 0.1 10*3/UL (ref 0–0.4)
EOSINOPHIL # BLD AUTO: 0.6 % (ref 1–4)
EPI CELLS #/AREA URNS HPF: (no result) /[HPF]
ERYTHROCYTE [DISTWIDTH] IN BLOOD BY AUTOMATED COUNT: 18.4 % (ref 0–14.5)
GLUCOSE UR QL: NEGATIVE
HCT VFR BLD AUTO: 32.1 % (ref 37–47)
HGB BLD-MCNC: 9.2 G/DL (ref 12–16)
HGB UR QL STRIP: NEGATIVE
KETONES UR QL STRIP: NEGATIVE
LEUKOCYTE ESTERASE UR QL STRIP: NEGATIVE
LYMPHOCYTES # BLD AUTO: 1.4 10*3/UL (ref 1.3–4.4)
LYMPHOCYTES NFR BLD AUTO: 17.3 % (ref 27–41)
MCH RBC QN AUTO: 24.8 PG (ref 27–31)
MCHC RBC AUTO-ENTMCNC: 28.7 G/DL (ref 33–37)
MCV RBC AUTO: 86.5 FL (ref 81–99)
MONOCYTES # BLD AUTO: 0.7 10*3/UL (ref 0.1–1)
MONOCYTES NFR BLD MANUAL: 8.4 % (ref 3–9)
NEUT #: 5.8 10*3/UL (ref 2.3–7.9)
NEUT %: 72.7 % (ref 47–73)
NITRITE UR QL STRIP: NEGATIVE
NRBC BLD QL AUTO: 0 10*3/UL (ref 0–0)
PH UR STRIP: 6.5 [PH] (ref 5–9)
PLATELET # BLD AUTO: 304 10*3/UL (ref 130–400)
PMV BLD AUTO: 9.7 FL (ref 9.6–12.3)
POTASSIUM SERPL-SCNC: 3.1 MMOL/L (ref 3.5–5.1)
PROT SERPL-MCNC: 7.2 GM/DL (ref 6.4–8.2)
RBC # BLD AUTO: 3.71 10*6/UL (ref 4.1–5.1)
RBC #/AREA URNS HPF: (no result) RBC/HPF (ref 0–2)
SODIUM SERPL-SCNC: 142 MMOL/L (ref 136–145)
SP GR UR: 1.01 (ref 1–1.03)
TROPONIN I SERPL-MCNC: 0.02 NG/ML (ref ?–0.04)
UROBILINOGEN UR STRIP-MCNC: 0.2 E.U./DL (ref 0.2–1)
WBC #/AREA URNS HPF: (no result) WBC/HPF (ref 0–5)
WBC NRBC COR # BLD AUTO: 8 10*3/UL (ref 4.8–10.8)

## 2019-01-23 NOTE — NUR
Occupational Therapy evaluation initiated with interview and orientation. When
patient asked to move in be bed, she declined and agreed to participate at
another date.
 Thi Middleton OTR/L

## 2019-01-23 NOTE — EKG
[place], [place]
 
                               ELECTROCARDIOGRAM REPORT
 
        NAME: MARCIA QUIROGA                  ACCT #: [id number]  
        UNIT #: [id number]                        ROOM: 509       
        DOCTOR: DORIAN DRAFT REPORT          BIRTHDATE: [birth year]
 
 
 

 
 
                           [facility]
                                       
Test Date:    2019               Test Time:    04:43:59
Pat Name:     MARCIA QUIROGA            Department:   
Patient ID:   [id number]             Room:         509
Gender:       F                        Technician:   Radha Yee
:          1940               Requested By: LINA WATSON
Order Number: [id number]      Reading MD:   Samy Nascimento MD
                                 Measurements
Intervals                              Axis          
Rate:         93                       P:            
CO:                                    QRS:          21
QRSD:         97                       T:            -44
QT:           346                                    
QTc:          431                                    
                           Interpretive Statements
Atrial fibrillation
Low voltage, extremity and precordial leads
Nonspecific repol abnormality, diffuse leads
Compared to ECG 2018 00:23:31
No significant change
 
Electronically Signed On 2019 22:07:41 PST by Samy Nascimento MD
 
CM:EKGRPT:ELECTROCARDIOGRAM REPORT
 
D: 19 0443
T: 197
    
LINA ESCOBAR DRAFT REPORT         
LINA WATSON DO

## 2019-01-23 NOTE — NUR
WHEN ENTERING THE ROOM TO DISCONNECT AND FLUSH PTS IV, BLOOD WAS NOTICED ON
RIGHT FOREARM. TAPE FROM THE IV SITE CAUSE A SKIN TEAR ON PTS RIGHT ARM. DR. RENDON WAS NOTIFIED AND WOUND CARE ORDERS WERE OBTAINED.  WAS
ALSO NOTIFIED.

## 2019-01-24 VITALS — DIASTOLIC BLOOD PRESSURE: 77 MMHG

## 2019-01-24 VITALS — DIASTOLIC BLOOD PRESSURE: 66 MMHG | SYSTOLIC BLOOD PRESSURE: 122 MMHG

## 2019-01-24 VITALS — DIASTOLIC BLOOD PRESSURE: 62 MMHG | SYSTOLIC BLOOD PRESSURE: 124 MMHG

## 2019-01-24 VITALS — DIASTOLIC BLOOD PRESSURE: 62 MMHG

## 2019-01-24 VITALS — DIASTOLIC BLOOD PRESSURE: 89 MMHG | SYSTOLIC BLOOD PRESSURE: 109 MMHG

## 2019-01-24 LAB
ALBUMIN SERPL-MCNC: 3.1 GM/DL (ref 3.1–4.5)
ALP SERPL-CCNC: 74 U/L (ref 45–117)
ALT SERPL W P-5'-P-CCNC: 30 U/L (ref 12–78)
AST SERPL-CCNC: 32 IU/L (ref 3–35)
BASOPHILS # BLD AUTO: 0 10*3/UL (ref 0–0.1)
BASOPHILS NFR BLD AUTO: 0.3 % (ref 0–1)
BUN SERPL-MCNC: 22 MG/DL (ref 7–24)
CHLORIDE SERPL-SCNC: 104 MMOL/L (ref 98–107)
CREAT SERPL-MCNC: 0.97 MG/DL (ref 0.55–1.02)
EOSINOPHIL # BLD AUTO: 0 % (ref 1–4)
EOSINOPHIL # BLD AUTO: 0 10*3/UL (ref 0–0.4)
ERYTHROCYTE [DISTWIDTH] IN BLOOD BY AUTOMATED COUNT: 18.5 % (ref 0–14.5)
HCT VFR BLD AUTO: 33 % (ref 37–47)
HGB BLD-MCNC: 9.4 G/DL (ref 12–16)
LYMPHOCYTES # BLD AUTO: 1.2 10*3/UL (ref 1.3–4.4)
LYMPHOCYTES NFR BLD AUTO: 19.5 % (ref 27–41)
MCH RBC QN AUTO: 24.6 PG (ref 27–31)
MCHC RBC AUTO-ENTMCNC: 28.5 G/DL (ref 33–37)
MCV RBC AUTO: 86.4 FL (ref 81–99)
MONOCYTES # BLD AUTO: 0.4 10*3/UL (ref 0.1–1)
MONOCYTES NFR BLD MANUAL: 5.8 % (ref 3–9)
NEUT #: 4.4 10*3/UL (ref 2.3–7.9)
NEUT %: 73.6 % (ref 47–73)
NRBC BLD QL AUTO: 0.3 % (ref 0–0)
PLATELET # BLD AUTO: 271 10*3/UL (ref 130–400)
PMV BLD AUTO: 9.8 FL (ref 9.6–12.3)
POTASSIUM SERPL-SCNC: 3.6 MMOL/L (ref 3.5–5.1)
PROT SERPL-MCNC: 6.8 GM/DL (ref 6.4–8.2)
RBC # BLD AUTO: 3.82 10*6/UL (ref 4.1–5.1)
SODIUM SERPL-SCNC: 144 MMOL/L (ref 136–145)
T4 FREE SERPL-MCNC: 1 NG/DL (ref 0.76–1.46)
TSH SERPL DL<=0.005 MIU/L-ACNC: 0.98 UIU/ML (ref 0.36–4.75)
WBC NRBC COR # BLD AUTO: 6 10*3/UL (ref 4.8–10.8)

## 2019-01-24 NOTE — NUR
Physical Therapy
 
Progress Note: Pt was seen for evaluation with moderate complexity determined
[id number].  She lives alone, though son lives near by, in single story home with 2
steps to enter.  She reports 2 recent falls outdoors and utilizes FWW for all
mobility. She is O2 dependent.  During evaluation she reports SOB during
transfers with oxygen desaturation to 81% while on 3 1/2 L O2 via NC.  Nursing
was notified and pt recovered within 2 minutes to 97%.  Pt appeared confused
during evaluation.  Recommend SNF at discharge.  Thank you for this referral.
Juana Rogers, PT

## 2019-01-24 NOTE — NUR
MARCIA QUIROGA [id number]
 
Please refer to the physician's history and physical for past medical history,
comorbid conditions, and allergies.
   Diagnosis: ALTERED MENTAL STATUS, PNEUMONIA
 
   Thomas Score: 17,AT RISK
 
WOUND DESCRIPTIONS:
Location of the wound: right forearm
Type of wound: skin tear
Thickness: Partial
Size: 0.4cm x 0.6cm x 0.1cm
Tunneling: none
Undermining: none
Sinus Tract: none
Presence of Exudate: Serosanguineous
Amount: Light
Color: Red
Odor: None
Periwound Skin Appearance: ecchymotic
Wound edges: approximated
Pain (associated with wound): none at time of assessment
How does patient state this happened? pt stated her skin is thin and just
tears
 
Location of the wound: left elbow
Type of wound: skin tear
Thickness: Partial
Size: 1.2cm x 1.6cm x 0.1
Tunneling: none
Undermining: none
Sinus Tract: none
Presence of Exudate: Serosanguineous
Amount: Moderate
Color: Red
Odor: None
Periwound Skin Appearance: ecchymotic
Wound edges: approximated
Pain (associated with wound): none at time of assessment
How does patient state this happened? pt unable to state how this happened
   Surface the patient is resting on: Isoflex
 
SKIN PREVENTION RECOMMENDATION:
   1.  Pressure redistribution support surface as appropriate
   2.  Elevate heels
   3.  Remove boots/TEDS every shift and reapply
   4.  Head of bed 30 degrees as tolerated
   5.  Assess nutrition and hydration
   6.  Manage moisture
   7.  Avoid the use of containment devices while in bed
   8.  Use absorptive products on surfaces limit layers of linens on bed
   9.  Turn and reposition every 1-2 hours in bed and every 1 hour in chair as
       tolerated
   10. Weight shifts every 15 minutes while up in chair
   11. Offloading with pillows or device to keep heels elevated off bed
   12. Monitor skin at least every shift
   13. Inspect under medical devices twice a day
 
WOUND TREATMENT RECOMMENDATIONS:
Clarify skin tear orders: cleanse right forearm and left elbow with nss and
apply sureprep around the wound hydrogel to wound bed cover with non-adherent
dressing and wrap lightly with gauze daily and prn for soiling.

## 2019-01-24 NOTE — NUR
Occupational Therapy evaluation completed on 5 with full eval to follow.
Precautions include fall risk, impaired cognition, bed/chair alarm, O2
dependent. Patient is moderate complexity level 51429 via chart review,
testing and evaluation. Recommend OT per pOC and SNF to enable return home
alone.Thank you.
Thi Middleton OTR/L

## 2019-01-24 NOTE — NUR
PT UNABLE TO ANSWER MY QUESTIONS APPROPRIATELY.  WILL TRY TO REACH FAMILY AND
TALK TO THEM.  WILL CONTINUE TO FOLLOW.

## 2019-01-24 NOTE — NUR
Patient is sleeping at this time. PT will check back tommorrow.
                                       DENI MON PTA

## 2019-01-25 ENCOUNTER — HOSPITAL ENCOUNTER (INPATIENT)
Dept: HOSPITAL 83 - 3N | Age: 79
LOS: 12 days | Discharge: OTHER INSTITUTION | DRG: 56 | End: 2019-02-06
Attending: PSYCHIATRY & NEUROLOGY | Admitting: PSYCHIATRY & NEUROLOGY
Payer: MEDICARE

## 2019-01-25 VITALS — DIASTOLIC BLOOD PRESSURE: 80 MMHG

## 2019-01-25 VITALS — DIASTOLIC BLOOD PRESSURE: 97 MMHG | SYSTOLIC BLOOD PRESSURE: 153 MMHG

## 2019-01-25 VITALS — SYSTOLIC BLOOD PRESSURE: 153 MMHG | DIASTOLIC BLOOD PRESSURE: 89 MMHG

## 2019-01-25 VITALS — DIASTOLIC BLOOD PRESSURE: 82 MMHG | SYSTOLIC BLOOD PRESSURE: 128 MMHG

## 2019-01-25 VITALS — DIASTOLIC BLOOD PRESSURE: 76 MMHG

## 2019-01-25 VITALS — WEIGHT: 171.13 LBS | HEIGHT: 63.98 IN | BODY MASS INDEX: 29.21 KG/M2

## 2019-01-25 VITALS — SYSTOLIC BLOOD PRESSURE: 143 MMHG | DIASTOLIC BLOOD PRESSURE: 76 MMHG

## 2019-01-25 VITALS — DIASTOLIC BLOOD PRESSURE: 77 MMHG

## 2019-01-25 DIAGNOSIS — D72.818: ICD-10-CM

## 2019-01-25 DIAGNOSIS — K58.9: ICD-10-CM

## 2019-01-25 DIAGNOSIS — Z86.73: ICD-10-CM

## 2019-01-25 DIAGNOSIS — J18.9: ICD-10-CM

## 2019-01-25 DIAGNOSIS — N17.0: ICD-10-CM

## 2019-01-25 DIAGNOSIS — F32.3: ICD-10-CM

## 2019-01-25 DIAGNOSIS — E87.5: ICD-10-CM

## 2019-01-25 DIAGNOSIS — Z90.710: ICD-10-CM

## 2019-01-25 DIAGNOSIS — M19.90: ICD-10-CM

## 2019-01-25 DIAGNOSIS — Z98.41: ICD-10-CM

## 2019-01-25 DIAGNOSIS — D64.9: ICD-10-CM

## 2019-01-25 DIAGNOSIS — Z90.722: ICD-10-CM

## 2019-01-25 DIAGNOSIS — F23: ICD-10-CM

## 2019-01-25 DIAGNOSIS — Z81.8: ICD-10-CM

## 2019-01-25 DIAGNOSIS — N32.81: ICD-10-CM

## 2019-01-25 DIAGNOSIS — J44.9: ICD-10-CM

## 2019-01-25 DIAGNOSIS — I50.32: ICD-10-CM

## 2019-01-25 DIAGNOSIS — E44.0: ICD-10-CM

## 2019-01-25 DIAGNOSIS — Z98.42: ICD-10-CM

## 2019-01-25 DIAGNOSIS — Z91.040: ICD-10-CM

## 2019-01-25 DIAGNOSIS — R41.9: ICD-10-CM

## 2019-01-25 DIAGNOSIS — Z90.49: ICD-10-CM

## 2019-01-25 DIAGNOSIS — F17.210: ICD-10-CM

## 2019-01-25 DIAGNOSIS — Z99.81: ICD-10-CM

## 2019-01-25 DIAGNOSIS — F02.80: ICD-10-CM

## 2019-01-25 DIAGNOSIS — Z82.49: ICD-10-CM

## 2019-01-25 DIAGNOSIS — D72.810: ICD-10-CM

## 2019-01-25 DIAGNOSIS — Z88.7: ICD-10-CM

## 2019-01-25 DIAGNOSIS — Z79.899: ICD-10-CM

## 2019-01-25 DIAGNOSIS — K21.9: ICD-10-CM

## 2019-01-25 DIAGNOSIS — Z83.6: ICD-10-CM

## 2019-01-25 DIAGNOSIS — R73.9: ICD-10-CM

## 2019-01-25 DIAGNOSIS — I11.0: ICD-10-CM

## 2019-01-25 DIAGNOSIS — G30.9: Primary | ICD-10-CM

## 2019-01-25 DIAGNOSIS — I48.1: ICD-10-CM

## 2019-01-25 NOTE — NUR
OT NOTE
Third attempt made to see pt this date and upon arrival pt was supine in bed
asleep. Pt would not arouse to verbal stimuli. Will continue with POC as able.
 
 
GLENN Brothers/FEDERICO

## 2019-01-25 NOTE — NUR
OT NOTE
Attempted to see pt this A.M. for OT session and upon arrival pt was eating
her breakfast. Will check back at a later time/date.
 
GLENN Brothers/FEDERICO

## 2019-01-25 NOTE — NUR
PHYSICAL THERAPY
Patient was not feeling well and confused. Patient could not participate in
therapy session this AM. WILL CHECK LATER.
                                            filippo bhatt pta

## 2019-01-25 NOTE — NUR
Discharge instructions reviewed with patient/family. Patient receptive and
verbalizes understanding. Follow-up care arranged. Written instructions given
to patient/family.
NADJA RIVERA

## 2019-01-26 VITALS — DIASTOLIC BLOOD PRESSURE: 69 MMHG | SYSTOLIC BLOOD PRESSURE: 130 MMHG

## 2019-01-26 VITALS — DIASTOLIC BLOOD PRESSURE: 75 MMHG

## 2019-01-26 LAB
CHOLEST SERPL-MCNC: 160 MG/DL (ref ?–200)
HDLC SERPL-MCNC: 41 MG/DL (ref 40–60)
LDLC SERPL DIRECT ASSAY-MCNC: 89 MG/DL (ref 9–159)
TRIGL SERPL-MCNC: 150 MG/DL (ref ?–150)
VLDLC SERPL CALC-MCNC: 30 MG/DL (ref 6–40)

## 2019-01-27 VITALS — DIASTOLIC BLOOD PRESSURE: 67 MMHG

## 2019-01-27 VITALS — SYSTOLIC BLOOD PRESSURE: 132 MMHG | DIASTOLIC BLOOD PRESSURE: 69 MMHG

## 2019-01-27 LAB
ALBUMIN SERPL-MCNC: 2.9 GM/DL (ref 3.1–4.5)
ALP SERPL-CCNC: 75 U/L (ref 45–117)
ALT SERPL W P-5'-P-CCNC: 52 U/L (ref 12–78)
AST SERPL-CCNC: 40 IU/L (ref 3–35)
BUN SERPL-MCNC: 18 MG/DL (ref 7–24)
CHLORIDE SERPL-SCNC: 111 MMOL/L (ref 98–107)
CREAT SERPL-MCNC: 0.78 MG/DL (ref 0.55–1.02)
POTASSIUM SERPL-SCNC: 2.8 MMOL/L (ref 3.5–5.1)
PROT SERPL-MCNC: 6.4 GM/DL (ref 6.4–8.2)
SODIUM SERPL-SCNC: 148 MMOL/L (ref 136–145)

## 2019-01-28 VITALS — DIASTOLIC BLOOD PRESSURE: 67 MMHG

## 2019-01-28 VITALS — DIASTOLIC BLOOD PRESSURE: 67 MMHG | SYSTOLIC BLOOD PRESSURE: 116 MMHG

## 2019-01-28 LAB
BUN SERPL-MCNC: 8 MG/DL (ref 7–24)
CHLORIDE SERPL-SCNC: 105 MMOL/L (ref 98–107)
CREAT SERPL-MCNC: 0.73 MG/DL (ref 0.55–1.02)
POTASSIUM SERPL-SCNC: 2.5 MMOL/L (ref 3.5–5.1)
SODIUM SERPL-SCNC: 148 MMOL/L (ref 136–145)

## 2019-01-28 NOTE — NUR
PHYSICAL THERAPY CO-SIGN
 
 
I approve of the Phyical Therapy notes written above.
 
                                ANDRE MURRAY PT

## 2019-01-29 VITALS — DIASTOLIC BLOOD PRESSURE: 67 MMHG

## 2019-01-29 VITALS — DIASTOLIC BLOOD PRESSURE: 86 MMHG

## 2019-01-30 VITALS — DIASTOLIC BLOOD PRESSURE: 64 MMHG | SYSTOLIC BLOOD PRESSURE: 113 MMHG

## 2019-01-30 VITALS — DIASTOLIC BLOOD PRESSURE: 75 MMHG

## 2019-01-30 VITALS — SYSTOLIC BLOOD PRESSURE: 120 MMHG | DIASTOLIC BLOOD PRESSURE: 75 MMHG

## 2019-01-30 VITALS — DIASTOLIC BLOOD PRESSURE: 53 MMHG | SYSTOLIC BLOOD PRESSURE: 121 MMHG

## 2019-01-30 LAB
ALBUMIN SERPL-MCNC: 2.7 GM/DL (ref 3.1–4.5)
ALP SERPL-CCNC: 68 U/L (ref 45–117)
ALT SERPL W P-5'-P-CCNC: 35 U/L (ref 12–78)
AST SERPL-CCNC: 24 IU/L (ref 3–35)
BASOPHILS # BLD AUTO: 0 10*3/UL (ref 0–0.1)
BASOPHILS NFR BLD AUTO: 0.2 % (ref 0–1)
BUN SERPL-MCNC: 13 MG/DL (ref 7–24)
CHLORIDE SERPL-SCNC: 109 MMOL/L (ref 98–107)
CREAT SERPL-MCNC: 0.73 MG/DL (ref 0.55–1.02)
EOSINOPHIL # BLD AUTO: 0.4 10*3/UL (ref 0–0.4)
EOSINOPHIL # BLD AUTO: 3.9 % (ref 1–4)
ERYTHROCYTE [DISTWIDTH] IN BLOOD BY AUTOMATED COUNT: 19.4 % (ref 0–14.5)
HCT VFR BLD AUTO: 37.6 % (ref 37–47)
HGB BLD-MCNC: 10.4 G/DL (ref 12–16)
LYMPHOCYTES # BLD AUTO: 1.9 10*3/UL (ref 1.3–4.4)
LYMPHOCYTES NFR BLD AUTO: 19.8 % (ref 27–41)
MCH RBC QN AUTO: 24.1 PG (ref 27–31)
MCHC RBC AUTO-ENTMCNC: 27.7 G/DL (ref 33–37)
MCV RBC AUTO: 87.2 FL (ref 81–99)
MONOCYTES # BLD AUTO: 1 10*3/UL (ref 0.1–1)
MONOCYTES NFR BLD MANUAL: 10 % (ref 3–9)
NEUT #: 6.3 10*3/UL (ref 2.3–7.9)
NEUT %: 65.5 % (ref 47–73)
NRBC BLD QL AUTO: 0 % (ref 0–0)
PLATELET # BLD AUTO: 288 10*3/UL (ref 130–400)
PMV BLD AUTO: 9.1 FL (ref 9.6–12.3)
POTASSIUM SERPL-SCNC: 3.6 MMOL/L (ref 3.5–5.1)
PROT SERPL-MCNC: 6.1 GM/DL (ref 6.4–8.2)
RBC # BLD AUTO: 4.31 10*6/UL (ref 4.1–5.1)
SODIUM SERPL-SCNC: 150 MMOL/L (ref 136–145)
WBC NRBC COR # BLD AUTO: 9.6 10*3/UL (ref 4.8–10.8)

## 2019-01-31 VITALS — DIASTOLIC BLOOD PRESSURE: 68 MMHG | SYSTOLIC BLOOD PRESSURE: 137 MMHG

## 2019-01-31 VITALS — SYSTOLIC BLOOD PRESSURE: 131 MMHG | DIASTOLIC BLOOD PRESSURE: 70 MMHG

## 2019-01-31 LAB
ALBUMIN SERPL-MCNC: 2.6 GM/DL (ref 3.1–4.5)
ALP SERPL-CCNC: 75 U/L (ref 45–117)
ALT SERPL W P-5'-P-CCNC: 26 U/L (ref 12–78)
AST SERPL-CCNC: 21 IU/L (ref 3–35)
BASOPHILS # BLD AUTO: 0 10*3/UL (ref 0–0.1)
BASOPHILS NFR BLD AUTO: 0.2 % (ref 0–1)
BUN SERPL-MCNC: 10 MG/DL (ref 7–24)
CHLORIDE SERPL-SCNC: 108 MMOL/L (ref 98–107)
CREAT SERPL-MCNC: 0.68 MG/DL (ref 0.55–1.02)
EOSINOPHIL # BLD AUTO: 0.5 10*3/UL (ref 0–0.4)
EOSINOPHIL # BLD AUTO: 4.7 % (ref 1–4)
ERYTHROCYTE [DISTWIDTH] IN BLOOD BY AUTOMATED COUNT: 19.2 % (ref 0–14.5)
HCT VFR BLD AUTO: 35.4 % (ref 37–47)
HGB BLD-MCNC: 9.9 G/DL (ref 12–16)
LYMPHOCYTES # BLD AUTO: 1.6 10*3/UL (ref 1.3–4.4)
LYMPHOCYTES NFR BLD AUTO: 16.3 % (ref 27–41)
MCH RBC QN AUTO: 24.4 PG (ref 27–31)
MCHC RBC AUTO-ENTMCNC: 28 G/DL (ref 33–37)
MCV RBC AUTO: 87.2 FL (ref 81–99)
MONOCYTES # BLD AUTO: 1.2 10*3/UL (ref 0.1–1)
MONOCYTES NFR BLD MANUAL: 12.3 % (ref 3–9)
NEUT #: 6.5 10*3/UL (ref 2.3–7.9)
NEUT %: 66 % (ref 47–73)
NRBC BLD QL AUTO: 0 10*3/UL (ref 0–0)
PLATELET # BLD AUTO: 260 10*3/UL (ref 130–400)
PMV BLD AUTO: 9.6 FL (ref 9.6–12.3)
POTASSIUM SERPL-SCNC: 3.8 MMOL/L (ref 3.5–5.1)
PROT SERPL-MCNC: 5.8 GM/DL (ref 6.4–8.2)
RBC # BLD AUTO: 4.06 10*6/UL (ref 4.1–5.1)
SODIUM SERPL-SCNC: 147 MMOL/L (ref 136–145)
WBC NRBC COR # BLD AUTO: 9.8 10*3/UL (ref 4.8–10.8)

## 2019-02-01 VITALS — DIASTOLIC BLOOD PRESSURE: 56 MMHG

## 2019-02-01 VITALS — DIASTOLIC BLOOD PRESSURE: 89 MMHG

## 2019-02-02 VITALS — DIASTOLIC BLOOD PRESSURE: 70 MMHG

## 2019-02-03 VITALS — SYSTOLIC BLOOD PRESSURE: 120 MMHG | DIASTOLIC BLOOD PRESSURE: 70 MMHG

## 2019-02-03 VITALS — DIASTOLIC BLOOD PRESSURE: 73 MMHG

## 2019-02-03 LAB
ALBUMIN SERPL-MCNC: 2.2 GM/DL (ref 3.1–4.5)
ALP SERPL-CCNC: 67 U/L (ref 45–117)
ALT SERPL W P-5'-P-CCNC: 23 U/L (ref 12–78)
AST SERPL-CCNC: 30 IU/L (ref 3–35)
BUN SERPL-MCNC: 11 MG/DL (ref 7–24)
CHLORIDE SERPL-SCNC: 109 MMOL/L (ref 98–107)
CREAT SERPL-MCNC: 0.56 MG/DL (ref 0.55–1.02)
POTASSIUM SERPL-SCNC: 5.2 MMOL/L (ref 3.5–5.1)
PROT SERPL-MCNC: 5.6 GM/DL (ref 6.4–8.2)
SODIUM SERPL-SCNC: 142 MMOL/L (ref 136–145)

## 2019-02-04 VITALS — SYSTOLIC BLOOD PRESSURE: 119 MMHG | DIASTOLIC BLOOD PRESSURE: 62 MMHG

## 2019-02-04 VITALS — DIASTOLIC BLOOD PRESSURE: 59 MMHG | SYSTOLIC BLOOD PRESSURE: 127 MMHG

## 2019-02-04 LAB
ALBUMIN SERPL-MCNC: 2.4 GM/DL (ref 3.1–4.5)
ALP SERPL-CCNC: 85 U/L (ref 45–117)
ALT SERPL W P-5'-P-CCNC: 23 U/L (ref 12–78)
AST SERPL-CCNC: 27 IU/L (ref 3–35)
BUN SERPL-MCNC: 15 MG/DL (ref 7–24)
CHLORIDE SERPL-SCNC: 106 MMOL/L (ref 98–107)
CREAT SERPL-MCNC: 0.57 MG/DL (ref 0.55–1.02)
POTASSIUM SERPL-SCNC: 4.4 MMOL/L (ref 3.5–5.1)
PROT SERPL-MCNC: 5.9 GM/DL (ref 6.4–8.2)
SODIUM SERPL-SCNC: 146 MMOL/L (ref 136–145)

## 2019-02-05 VITALS — DIASTOLIC BLOOD PRESSURE: 72 MMHG

## 2019-02-05 VITALS — DIASTOLIC BLOOD PRESSURE: 67 MMHG

## 2019-02-06 VITALS — DIASTOLIC BLOOD PRESSURE: 65 MMHG

## 2019-02-06 VITALS — DIASTOLIC BLOOD PRESSURE: 58 MMHG

## 2021-06-07 ENCOUNTER — HOSPITAL ENCOUNTER (INPATIENT)
Dept: HOSPITAL 83 - ED | Age: 81
LOS: 1 days | Discharge: NURSING, REHAB OR LONG-TERM CARE | DRG: 178 | End: 2021-06-08
Attending: STUDENT IN AN ORGANIZED HEALTH CARE EDUCATION/TRAINING PROGRAM | Admitting: STUDENT IN AN ORGANIZED HEALTH CARE EDUCATION/TRAINING PROGRAM
Payer: MEDICARE

## 2021-06-07 VITALS — SYSTOLIC BLOOD PRESSURE: 179 MMHG | DIASTOLIC BLOOD PRESSURE: 91 MMHG

## 2021-06-07 VITALS — SYSTOLIC BLOOD PRESSURE: 147 MMHG | DIASTOLIC BLOOD PRESSURE: 63 MMHG

## 2021-06-07 VITALS — DIASTOLIC BLOOD PRESSURE: 92 MMHG

## 2021-06-07 VITALS — DIASTOLIC BLOOD PRESSURE: 68 MMHG

## 2021-06-07 VITALS — BODY MASS INDEX: 23.94 KG/M2 | HEIGHT: 62.99 IN | WEIGHT: 135.13 LBS

## 2021-06-07 DIAGNOSIS — Z90.721: ICD-10-CM

## 2021-06-07 DIAGNOSIS — F17.210: ICD-10-CM

## 2021-06-07 DIAGNOSIS — Z88.7: ICD-10-CM

## 2021-06-07 DIAGNOSIS — E44.0: ICD-10-CM

## 2021-06-07 DIAGNOSIS — Z98.42: ICD-10-CM

## 2021-06-07 DIAGNOSIS — Z82.49: ICD-10-CM

## 2021-06-07 DIAGNOSIS — Z83.6: ICD-10-CM

## 2021-06-07 DIAGNOSIS — J69.0: Primary | ICD-10-CM

## 2021-06-07 DIAGNOSIS — M19.90: ICD-10-CM

## 2021-06-07 DIAGNOSIS — I48.91: ICD-10-CM

## 2021-06-07 DIAGNOSIS — D68.69: ICD-10-CM

## 2021-06-07 DIAGNOSIS — I50.32: ICD-10-CM

## 2021-06-07 DIAGNOSIS — K21.9: ICD-10-CM

## 2021-06-07 DIAGNOSIS — N32.81: ICD-10-CM

## 2021-06-07 DIAGNOSIS — Z90.710: ICD-10-CM

## 2021-06-07 DIAGNOSIS — I11.0: ICD-10-CM

## 2021-06-07 DIAGNOSIS — J96.11: ICD-10-CM

## 2021-06-07 DIAGNOSIS — R00.1: ICD-10-CM

## 2021-06-07 DIAGNOSIS — R13.10: ICD-10-CM

## 2021-06-07 DIAGNOSIS — Z98.41: ICD-10-CM

## 2021-06-07 DIAGNOSIS — F03.90: ICD-10-CM

## 2021-06-07 DIAGNOSIS — Z91.040: ICD-10-CM

## 2021-06-07 DIAGNOSIS — Z66: ICD-10-CM

## 2021-06-07 DIAGNOSIS — G25.81: ICD-10-CM

## 2021-06-07 DIAGNOSIS — Z87.01: ICD-10-CM

## 2021-06-07 DIAGNOSIS — Z90.49: ICD-10-CM

## 2021-06-07 DIAGNOSIS — Z86.73: ICD-10-CM

## 2021-06-07 DIAGNOSIS — J44.9: ICD-10-CM

## 2021-06-07 DIAGNOSIS — Z81.8: ICD-10-CM

## 2021-06-07 DIAGNOSIS — J15.6: ICD-10-CM

## 2021-06-07 DIAGNOSIS — F32.9: ICD-10-CM

## 2021-06-07 LAB
ALBUMIN SERPL-MCNC: 3 GM/DL (ref 3.1–4.5)
ALP SERPL-CCNC: 89 U/L (ref 45–117)
ALT SERPL W P-5'-P-CCNC: 13 U/L (ref 12–78)
AST SERPL-CCNC: 15 IU/L (ref 3–35)
BASOPHILS # BLD AUTO: 0 10*3/UL (ref 0–0.1)
BASOPHILS NFR BLD AUTO: 0.7 % (ref 0–1)
BUN SERPL-MCNC: 22 MG/DL (ref 7–24)
CHLORIDE SERPL-SCNC: 112 MMOL/L (ref 98–107)
CREAT SERPL-MCNC: 0.89 MG/DL (ref 0.55–1.02)
EOSINOPHIL # BLD AUTO: 0.2 10*3/UL (ref 0–0.4)
EOSINOPHIL # BLD AUTO: 2.7 % (ref 1–4)
ERYTHROCYTE [DISTWIDTH] IN BLOOD BY AUTOMATED COUNT: 14.5 % (ref 0–14.5)
HCT VFR BLD AUTO: 38.5 % (ref 37–47)
LYMPHOCYTES # BLD AUTO: 1.4 10*3/UL (ref 1.3–4.4)
LYMPHOCYTES NFR BLD AUTO: 24.3 % (ref 27–41)
MCH RBC QN AUTO: 30.4 PG (ref 27–31)
MCHC RBC AUTO-ENTMCNC: 31.4 G/DL (ref 33–37)
MCV RBC AUTO: 96.7 FL (ref 81–99)
MONOCYTES # BLD AUTO: 0.7 10*3/UL (ref 0.1–1)
MONOCYTES NFR BLD MANUAL: 11.8 % (ref 3–9)
NEUT #: 3.5 10*3/UL (ref 2.3–7.9)
NEUT %: 60.3 % (ref 47–73)
NRBC BLD QL AUTO: 0 10*3/UL (ref 0–0)
PLATELET # BLD AUTO: 173 10*3/UL (ref 130–400)
PMV BLD AUTO: 9.9 FL (ref 9.6–12.3)
POTASSIUM SERPL-SCNC: 4.7 MMOL/L (ref 3.5–5.1)
PROT SERPL-MCNC: 6.9 GM/DL (ref 6.4–8.2)
RBC # BLD AUTO: 3.98 10*6/UL (ref 4.1–5.1)
SODIUM SERPL-SCNC: 140 MMOL/L (ref 136–145)
TROPONIN I SERPL-MCNC: 0.03 NG/ML (ref ?–0.04)
WBC NRBC COR # BLD AUTO: 5.8 10*3/UL (ref 4.8–10.8)

## 2021-06-08 VITALS — SYSTOLIC BLOOD PRESSURE: 162 MMHG | DIASTOLIC BLOOD PRESSURE: 92 MMHG

## 2021-06-08 VITALS — DIASTOLIC BLOOD PRESSURE: 102 MMHG

## 2021-06-08 VITALS — DIASTOLIC BLOOD PRESSURE: 100 MMHG

## 2021-06-08 VITALS — SYSTOLIC BLOOD PRESSURE: 158 MMHG | DIASTOLIC BLOOD PRESSURE: 98 MMHG

## 2021-06-08 VITALS — DIASTOLIC BLOOD PRESSURE: 78 MMHG

## 2021-06-08 LAB
25(OH)D3 SERPL-MCNC: 31.2 NG/ML (ref 30–100)
ALBUMIN SERPL-MCNC: 3.5 GM/DL (ref 3.1–4.5)
ALP SERPL-CCNC: 88 U/L (ref 45–117)
ALT SERPL W P-5'-P-CCNC: 13 U/L (ref 12–78)
AST SERPL-CCNC: 14 IU/L (ref 3–35)
BACTERIA #/AREA URNS HPF: (no result) /[HPF]
BASOPHILS # BLD AUTO: 0 10*3/UL (ref 0–0.1)
BASOPHILS NFR BLD AUTO: 0.5 % (ref 0–1)
BUN SERPL-MCNC: 14 MG/DL (ref 7–24)
CHLORIDE SERPL-SCNC: 106 MMOL/L (ref 98–107)
CHOLEST SERPL-MCNC: 135 MG/DL (ref ?–200)
CREAT SERPL-MCNC: 0.67 MG/DL (ref 0.55–1.02)
EOSINOPHIL # BLD AUTO: 0.2 10*3/UL (ref 0–0.4)
EOSINOPHIL # BLD AUTO: 3 % (ref 1–4)
EPI CELLS #/AREA URNS HPF: (no result) /[HPF]
ERYTHROCYTE [DISTWIDTH] IN BLOOD BY AUTOMATED COUNT: 14.3 % (ref 0–14.5)
HCT VFR BLD AUTO: 41.8 % (ref 37–47)
LDLC SERPL DIRECT ASSAY-MCNC: 58 MG/DL (ref 9–159)
LEUKOCYTE ESTERASE UR QL STRIP: (no result)
LYMPHOCYTES # BLD AUTO: 1.7 10*3/UL (ref 1.3–4.4)
LYMPHOCYTES NFR BLD AUTO: 30.2 % (ref 27–41)
MCH RBC QN AUTO: 29.8 PG (ref 27–31)
MCHC RBC AUTO-ENTMCNC: 31.1 G/DL (ref 33–37)
MCV RBC AUTO: 95.9 FL (ref 81–99)
MONOCYTES # BLD AUTO: 0.7 10*3/UL (ref 0.1–1)
MONOCYTES NFR BLD MANUAL: 11.9 % (ref 3–9)
NEUT #: 3.1 10*3/UL (ref 2.3–7.9)
NEUT %: 54.2 % (ref 47–73)
NRBC BLD QL AUTO: 0 10*3/UL (ref 0–0)
PH UR STRIP: 8 [PH] (ref 4.5–8)
PLATELET # BLD AUTO: 180 10*3/UL (ref 130–400)
PMV BLD AUTO: 9.8 FL (ref 9.6–12.3)
POTASSIUM SERPL-SCNC: 3.6 MMOL/L (ref 3.5–5.1)
PROT SERPL-MCNC: 7.4 GM/DL (ref 6.4–8.2)
RBC # BLD AUTO: 4.36 10*6/UL (ref 4.1–5.1)
SODIUM SERPL-SCNC: 142 MMOL/L (ref 136–145)
SP GR UR: <= 1.005 (ref 1–1.03)
T4 FREE SERPL-MCNC: 0.92 NG/DL (ref 0.76–1.46)
TRIGL SERPL-MCNC: 105 MG/DL (ref ?–150)
TSH SERPL DL<=0.005 MIU/L-ACNC: 1.54 UIU/ML (ref 0.36–4.75)
UROBILINOGEN UR STRIP-MCNC: 0.2 E.U./DL (ref 0–1)
VITAMIN B12: 382 PG/ML (ref 247–911)
WBC #/AREA URNS HPF: (no result) WBC/HPF (ref 0–5)
WBC NRBC COR # BLD AUTO: 5.6 10*3/UL (ref 4.8–10.8)

## 2021-08-10 ENCOUNTER — HOSPITAL ENCOUNTER (INPATIENT)
Dept: HOSPITAL 83 - ED | Age: 81
LOS: 6 days | Discharge: NURSING, REHAB OR LONG-TERM CARE | DRG: 871 | End: 2021-08-16
Attending: INTERNAL MEDICINE | Admitting: INTERNAL MEDICINE
Payer: MEDICARE

## 2021-08-10 VITALS — WEIGHT: 146 LBS | HEIGHT: 55 IN

## 2021-08-10 VITALS — DIASTOLIC BLOOD PRESSURE: 88 MMHG

## 2021-08-10 VITALS — DIASTOLIC BLOOD PRESSURE: 111 MMHG

## 2021-08-10 VITALS — DIASTOLIC BLOOD PRESSURE: 102 MMHG

## 2021-08-10 VITALS — DIASTOLIC BLOOD PRESSURE: 83 MMHG

## 2021-08-10 VITALS — DIASTOLIC BLOOD PRESSURE: 71 MMHG

## 2021-08-10 DIAGNOSIS — Z79.899: ICD-10-CM

## 2021-08-10 DIAGNOSIS — I48.0: ICD-10-CM

## 2021-08-10 DIAGNOSIS — F17.210: ICD-10-CM

## 2021-08-10 DIAGNOSIS — J18.9: ICD-10-CM

## 2021-08-10 DIAGNOSIS — I08.1: ICD-10-CM

## 2021-08-10 DIAGNOSIS — R73.9: ICD-10-CM

## 2021-08-10 DIAGNOSIS — Z86.73: ICD-10-CM

## 2021-08-10 DIAGNOSIS — Z90.49: ICD-10-CM

## 2021-08-10 DIAGNOSIS — Z88.7: ICD-10-CM

## 2021-08-10 DIAGNOSIS — J44.0: ICD-10-CM

## 2021-08-10 DIAGNOSIS — Z91.040: ICD-10-CM

## 2021-08-10 DIAGNOSIS — Z98.41: ICD-10-CM

## 2021-08-10 DIAGNOSIS — K58.9: ICD-10-CM

## 2021-08-10 DIAGNOSIS — E83.41: ICD-10-CM

## 2021-08-10 DIAGNOSIS — Z98.42: ICD-10-CM

## 2021-08-10 DIAGNOSIS — Z87.01: ICD-10-CM

## 2021-08-10 DIAGNOSIS — F32.9: ICD-10-CM

## 2021-08-10 DIAGNOSIS — K21.9: ICD-10-CM

## 2021-08-10 DIAGNOSIS — F03.90: ICD-10-CM

## 2021-08-10 DIAGNOSIS — Z90.710: ICD-10-CM

## 2021-08-10 DIAGNOSIS — R65.20: ICD-10-CM

## 2021-08-10 DIAGNOSIS — I50.31: ICD-10-CM

## 2021-08-10 DIAGNOSIS — I11.0: ICD-10-CM

## 2021-08-10 DIAGNOSIS — A41.9: Primary | ICD-10-CM

## 2021-08-10 DIAGNOSIS — Z81.8: ICD-10-CM

## 2021-08-10 DIAGNOSIS — J44.1: ICD-10-CM

## 2021-08-10 DIAGNOSIS — I27.20: ICD-10-CM

## 2021-08-10 DIAGNOSIS — M19.90: ICD-10-CM

## 2021-08-10 DIAGNOSIS — Z83.6: ICD-10-CM

## 2021-08-10 DIAGNOSIS — Z90.721: ICD-10-CM

## 2021-08-10 DIAGNOSIS — Z82.49: ICD-10-CM

## 2021-08-10 DIAGNOSIS — J96.21: ICD-10-CM

## 2021-08-10 LAB
ALBUMIN SERPL-MCNC: 3.9 GM/DL (ref 3.1–4.5)
ALP SERPL-CCNC: 94 U/L (ref 45–117)
ALT SERPL W P-5'-P-CCNC: 19 U/L (ref 12–78)
AST SERPL-CCNC: 22 IU/L (ref 3–35)
BASE EXCESS BLDA CALC-SCNC: 4.3 MMOL/L (ref -2–2)
BASOPHILS # BLD AUTO: 0.1 10*3/UL (ref 0–0.1)
BASOPHILS NFR BLD AUTO: 0.6 % (ref 0–1)
BUN SERPL-MCNC: 17 MG/DL (ref 7–24)
CHLORIDE SERPL-SCNC: 103 MMOL/L (ref 98–107)
CREAT SERPL-MCNC: 0.7 MG/DL (ref 0.55–1.02)
EOSINOPHIL # BLD AUTO: 0 % (ref 1–4)
EOSINOPHIL # BLD AUTO: 0 10*3/UL (ref 0–0.4)
ERYTHROCYTE [DISTWIDTH] IN BLOOD BY AUTOMATED COUNT: 13.8 % (ref 0–14.5)
HCT VFR BLD AUTO: 40.5 % (ref 37–47)
LYMPHOCYTES # BLD AUTO: 0.9 10*3/UL (ref 1.3–4.4)
LYMPHOCYTES NFR BLD AUTO: 10.4 % (ref 27–41)
MCH RBC QN AUTO: 31.4 PG (ref 27–31)
MCHC RBC AUTO-ENTMCNC: 32.6 G/DL (ref 33–37)
MCV RBC AUTO: 96.2 FL (ref 81–99)
MONOCYTES # BLD AUTO: 0.9 10*3/UL (ref 0.1–1)
MONOCYTES NFR BLD MANUAL: 9.9 % (ref 3–9)
NEUT #: 6.8 10*3/UL (ref 2.3–7.9)
NEUT %: 78.8 % (ref 47–73)
NRBC BLD QL AUTO: 0 10*3/UL (ref 0–0)
PCO2 BLDA: 53 MMHG (ref 35–45)
PH BLDA: 7.38 [PH] (ref 7.35–7.45)
PLATELET # BLD AUTO: 311 10*3/UL (ref 130–400)
PMV BLD AUTO: 11.9 FL (ref 9.6–12.3)
PO2 BLDA: 88.2 MM[HG] (ref 80–90)
POTASSIUM SERPL-SCNC: 3.9 MMOL/L (ref 3.5–5.1)
PROT SERPL-MCNC: 8.2 GM/DL (ref 6.4–8.2)
RBC # BLD AUTO: 4.21 10*6/UL (ref 4.1–5.1)
SAO2 % BLDA: 97 % (ref 95–97)
SODIUM SERPL-SCNC: 138 MMOL/L (ref 136–145)
TROPONIN I SERPL-MCNC: 0.05 NG/ML (ref ?–0.04)
WBC NRBC COR # BLD AUTO: 8.7 10*3/UL (ref 4.8–10.8)

## 2021-08-11 VITALS — SYSTOLIC BLOOD PRESSURE: 168 MMHG | DIASTOLIC BLOOD PRESSURE: 117 MMHG

## 2021-08-11 VITALS — DIASTOLIC BLOOD PRESSURE: 88 MMHG

## 2021-08-11 VITALS — DIASTOLIC BLOOD PRESSURE: 84 MMHG | SYSTOLIC BLOOD PRESSURE: 162 MMHG

## 2021-08-11 VITALS — DIASTOLIC BLOOD PRESSURE: 96 MMHG | SYSTOLIC BLOOD PRESSURE: 156 MMHG

## 2021-08-11 VITALS — SYSTOLIC BLOOD PRESSURE: 170 MMHG | DIASTOLIC BLOOD PRESSURE: 92 MMHG

## 2021-08-11 VITALS — DIASTOLIC BLOOD PRESSURE: 81 MMHG | SYSTOLIC BLOOD PRESSURE: 168 MMHG

## 2021-08-11 VITALS — DIASTOLIC BLOOD PRESSURE: 92 MMHG | SYSTOLIC BLOOD PRESSURE: 170 MMHG

## 2021-08-11 VITALS — DIASTOLIC BLOOD PRESSURE: 104 MMHG

## 2021-08-11 VITALS — DIASTOLIC BLOOD PRESSURE: 86 MMHG

## 2021-08-11 LAB
ALBUMIN SERPL-MCNC: 3.4 GM/DL (ref 3.1–4.5)
ALP SERPL-CCNC: 79 U/L (ref 45–117)
ALT SERPL W P-5'-P-CCNC: 18 U/L (ref 12–78)
AST SERPL-CCNC: 20 IU/L (ref 3–35)
BASOPHILS # BLD AUTO: 0 10*3/UL (ref 0–0.1)
BASOPHILS NFR BLD AUTO: 0.5 % (ref 0–1)
BUN SERPL-MCNC: 16 MG/DL (ref 7–24)
CHLORIDE SERPL-SCNC: 106 MMOL/L (ref 98–107)
CREAT SERPL-MCNC: 0.68 MG/DL (ref 0.55–1.02)
EOSINOPHIL # BLD AUTO: 0.2 10*3/UL (ref 0–0.4)
EOSINOPHIL # BLD AUTO: 3.3 % (ref 1–4)
ERYTHROCYTE [DISTWIDTH] IN BLOOD BY AUTOMATED COUNT: 13.3 % (ref 0–14.5)
HCT VFR BLD AUTO: 41.8 % (ref 37–47)
LYMPHOCYTES # BLD AUTO: 0.8 10*3/UL (ref 1.3–4.4)
LYMPHOCYTES NFR BLD AUTO: 13 % (ref 27–41)
MCH RBC QN AUTO: 30.4 PG (ref 27–31)
MCHC RBC AUTO-ENTMCNC: 30.1 G/DL (ref 33–37)
MCV RBC AUTO: 100.7 FL (ref 81–99)
MONOCYTES # BLD AUTO: 0.2 10*3/UL (ref 0.1–1)
MONOCYTES NFR BLD MANUAL: 3.7 % (ref 3–9)
NEUT #: 4.5 10*3/UL (ref 2.3–7.9)
NEUT %: 79 % (ref 47–73)
NRBC BLD QL AUTO: 0 % (ref 0–0)
PLATELET # BLD AUTO: 142 10*3/UL (ref 130–400)
PMV BLD AUTO: 10.3 FL (ref 9.6–12.3)
POTASSIUM SERPL-SCNC: 3.6 MMOL/L (ref 3.5–5.1)
PROT SERPL-MCNC: 7.5 GM/DL (ref 6.4–8.2)
RBC # BLD AUTO: 4.15 10*6/UL (ref 4.1–5.1)
SODIUM SERPL-SCNC: 141 MMOL/L (ref 136–145)
TROPONIN I SERPL-MCNC: 0.15 NG/ML (ref ?–0.04)
WBC NRBC COR # BLD AUTO: 5.8 10*3/UL (ref 4.8–10.8)

## 2021-08-12 VITALS — DIASTOLIC BLOOD PRESSURE: 77 MMHG | SYSTOLIC BLOOD PRESSURE: 161 MMHG

## 2021-08-12 VITALS — SYSTOLIC BLOOD PRESSURE: 140 MMHG | DIASTOLIC BLOOD PRESSURE: 69 MMHG

## 2021-08-12 VITALS — DIASTOLIC BLOOD PRESSURE: 86 MMHG

## 2021-08-12 VITALS — DIASTOLIC BLOOD PRESSURE: 90 MMHG

## 2021-08-12 VITALS — DIASTOLIC BLOOD PRESSURE: 80 MMHG

## 2021-08-12 LAB
BASOPHILS # BLD AUTO: 0 10*3/UL (ref 0–0.1)
BASOPHILS NFR BLD AUTO: 0.1 % (ref 0–1)
BUN SERPL-MCNC: 27 MG/DL (ref 7–24)
CHLORIDE SERPL-SCNC: 105 MMOL/L (ref 98–107)
CREAT SERPL-MCNC: 0.84 MG/DL (ref 0.55–1.02)
EOSINOPHIL # BLD AUTO: 0 % (ref 1–4)
EOSINOPHIL # BLD AUTO: 0 10*3/UL (ref 0–0.4)
ERYTHROCYTE [DISTWIDTH] IN BLOOD BY AUTOMATED COUNT: 13.2 % (ref 0–14.5)
HCT VFR BLD AUTO: 44.7 % (ref 37–47)
LYMPHOCYTES # BLD AUTO: 1.1 10*3/UL (ref 1.3–4.4)
LYMPHOCYTES NFR BLD AUTO: 12.9 % (ref 27–41)
MCH RBC QN AUTO: 30.3 PG (ref 27–31)
MCHC RBC AUTO-ENTMCNC: 30.4 G/DL (ref 33–37)
MCV RBC AUTO: 99.6 FL (ref 81–99)
MONOCYTES # BLD AUTO: 1.1 10*3/UL (ref 0.1–1)
MONOCYTES NFR BLD MANUAL: 13.3 % (ref 3–9)
NEUT #: 6 10*3/UL (ref 2.3–7.9)
NEUT %: 73.2 % (ref 47–73)
NRBC BLD QL AUTO: 0 10*3/UL (ref 0–0)
PLATELET # BLD AUTO: 171 10*3/UL (ref 130–400)
PMV BLD AUTO: 9.9 FL (ref 9.6–12.3)
POTASSIUM SERPL-SCNC: 3.6 MMOL/L (ref 3.5–5.1)
RBC # BLD AUTO: 4.49 10*6/UL (ref 4.1–5.1)
SODIUM SERPL-SCNC: 144 MMOL/L (ref 136–145)
WBC NRBC COR # BLD AUTO: 8.1 10*3/UL (ref 4.8–10.8)

## 2021-08-13 VITALS — DIASTOLIC BLOOD PRESSURE: 88 MMHG

## 2021-08-13 VITALS — SYSTOLIC BLOOD PRESSURE: 162 MMHG | DIASTOLIC BLOOD PRESSURE: 90 MMHG

## 2021-08-13 VITALS — SYSTOLIC BLOOD PRESSURE: 134 MMHG | DIASTOLIC BLOOD PRESSURE: 94 MMHG

## 2021-08-13 VITALS — SYSTOLIC BLOOD PRESSURE: 147 MMHG | DIASTOLIC BLOOD PRESSURE: 88 MMHG

## 2021-08-13 VITALS — DIASTOLIC BLOOD PRESSURE: 69 MMHG

## 2021-08-13 LAB
BUN SERPL-MCNC: 32 MG/DL (ref 7–24)
CHLORIDE SERPL-SCNC: 104 MMOL/L (ref 98–107)
CREAT SERPL-MCNC: 0.83 MG/DL (ref 0.55–1.02)
POTASSIUM SERPL-SCNC: 3.4 MMOL/L (ref 3.5–5.1)
SODIUM SERPL-SCNC: 144 MMOL/L (ref 136–145)

## 2021-08-14 VITALS — SYSTOLIC BLOOD PRESSURE: 129 MMHG | DIASTOLIC BLOOD PRESSURE: 75 MMHG

## 2021-08-14 VITALS — DIASTOLIC BLOOD PRESSURE: 91 MMHG

## 2021-08-14 VITALS — SYSTOLIC BLOOD PRESSURE: 160 MMHG | DIASTOLIC BLOOD PRESSURE: 86 MMHG

## 2021-08-14 VITALS — DIASTOLIC BLOOD PRESSURE: 72 MMHG

## 2021-08-14 LAB
BUN SERPL-MCNC: 37 MG/DL (ref 7–24)
CHLORIDE SERPL-SCNC: 103 MMOL/L (ref 98–107)
CREAT SERPL-MCNC: 0.88 MG/DL (ref 0.55–1.02)
POTASSIUM SERPL-SCNC: 3.8 MMOL/L (ref 3.5–5.1)
SODIUM SERPL-SCNC: 144 MMOL/L (ref 136–145)

## 2021-08-15 VITALS — DIASTOLIC BLOOD PRESSURE: 72 MMHG | SYSTOLIC BLOOD PRESSURE: 123 MMHG

## 2021-08-15 VITALS — SYSTOLIC BLOOD PRESSURE: 173 MMHG | DIASTOLIC BLOOD PRESSURE: 72 MMHG

## 2021-08-15 VITALS — DIASTOLIC BLOOD PRESSURE: 74 MMHG | SYSTOLIC BLOOD PRESSURE: 148 MMHG

## 2021-08-15 VITALS — DIASTOLIC BLOOD PRESSURE: 95 MMHG

## 2021-08-15 LAB
BUN SERPL-MCNC: 39 MG/DL (ref 7–24)
CHLORIDE SERPL-SCNC: 103 MMOL/L (ref 98–107)
CREAT SERPL-MCNC: 0.83 MG/DL (ref 0.55–1.02)
POTASSIUM SERPL-SCNC: 3.9 MMOL/L (ref 3.5–5.1)
SODIUM SERPL-SCNC: 144 MMOL/L (ref 136–145)

## 2021-08-16 VITALS — DIASTOLIC BLOOD PRESSURE: 43 MMHG | SYSTOLIC BLOOD PRESSURE: 144 MMHG

## 2021-08-16 VITALS — DIASTOLIC BLOOD PRESSURE: 87 MMHG

## 2021-08-16 LAB
BUN SERPL-MCNC: 40 MG/DL (ref 7–24)
CHLORIDE SERPL-SCNC: 104 MMOL/L (ref 98–107)
CREAT SERPL-MCNC: 0.78 MG/DL (ref 0.55–1.02)
POTASSIUM SERPL-SCNC: 4 MMOL/L (ref 3.5–5.1)
SODIUM SERPL-SCNC: 139 MMOL/L (ref 136–145)

## 2021-09-19 ENCOUNTER — HOSPITAL ENCOUNTER (EMERGENCY)
Dept: HOSPITAL 83 - ED | Age: 81
Discharge: NURSING, REHAB OR LONG-TERM CARE | End: 2021-09-19
Payer: MEDICARE

## 2021-09-19 VITALS — WEIGHT: 160 LBS | HEIGHT: 63.98 IN | BODY MASS INDEX: 27.31 KG/M2

## 2021-09-19 DIAGNOSIS — I11.0: ICD-10-CM

## 2021-09-19 DIAGNOSIS — I48.91: ICD-10-CM

## 2021-09-19 DIAGNOSIS — M19.90: ICD-10-CM

## 2021-09-19 DIAGNOSIS — Z79.899: ICD-10-CM

## 2021-09-19 DIAGNOSIS — F32.9: ICD-10-CM
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
## 2021-09-19 DIAGNOSIS — Z98.890: ICD-10-CM

## 2021-09-19 DIAGNOSIS — Z88.7: ICD-10-CM

## 2021-09-19 DIAGNOSIS — Z90.711: ICD-10-CM

## 2021-09-19 DIAGNOSIS — F03.90: ICD-10-CM

## 2021-09-19 DIAGNOSIS — Z90.49: ICD-10-CM

## 2021-09-19 DIAGNOSIS — Z91.040: ICD-10-CM

## 2021-09-19 DIAGNOSIS — S51.011A: ICD-10-CM
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
## 2021-09-19 DIAGNOSIS — S01.01XA: Primary | ICD-10-CM

## 2021-09-19 DIAGNOSIS — J44.9: ICD-10-CM
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
## 2021-09-19 DIAGNOSIS — F17.200: ICD-10-CM

## 2021-09-19 DIAGNOSIS — I50.9: ICD-10-CM

## 2021-09-19 DIAGNOSIS — S61.511A: ICD-10-CM

## 2021-09-19 DIAGNOSIS — Z90.89: ICD-10-CM

## 2021-10-14 ENCOUNTER — HOSPITAL ENCOUNTER (EMERGENCY)
Dept: HOSPITAL 83 - ED | Age: 81
Discharge: NURSING, REHAB OR LONG-TERM CARE | End: 2021-10-14
Payer: MEDICARE

## 2021-10-14 ENCOUNTER — HOSPITAL ENCOUNTER (EMERGENCY)
Dept: HOSPITAL 83 - ED | Age: 81
Discharge: HOME | End: 2021-10-14
Payer: MEDICARE

## 2021-10-14 VITALS — HEIGHT: 65.98 IN | WEIGHT: 162 LBS | BODY MASS INDEX: 26.03 KG/M2

## 2021-10-14 VITALS — HEIGHT: 55 IN

## 2021-10-14 DIAGNOSIS — S01.03XA: Primary | ICD-10-CM

## 2021-10-14 DIAGNOSIS — F17.200: ICD-10-CM

## 2021-10-14 DIAGNOSIS — I10: ICD-10-CM

## 2021-10-14 DIAGNOSIS — Z88.7: ICD-10-CM

## 2021-10-14 DIAGNOSIS — S09.90XA: Primary | ICD-10-CM
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
## 2021-10-14 DIAGNOSIS — S00.03XA: ICD-10-CM
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
## 2021-10-14 DIAGNOSIS — Z91.040: ICD-10-CM
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
## 2021-10-14 DIAGNOSIS — Z79.899: ICD-10-CM

## 2021-10-14 DIAGNOSIS — K21.9: ICD-10-CM
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
## 2021-10-14 DIAGNOSIS — J44.9: ICD-10-CM

## 2021-10-14 DIAGNOSIS — I48.91: ICD-10-CM

## 2021-10-14 LAB
ALBUMIN SERPL-MCNC: 3.6 GM/DL (ref 3.1–4.5)
ALP SERPL-CCNC: 66 U/L (ref 45–117)
ALT SERPL W P-5'-P-CCNC: 19 U/L (ref 12–78)
APTT PPP: 27 SECONDS (ref 20–32.1)
AST SERPL-CCNC: 12 IU/L (ref 3–35)
BASOPHILS # BLD AUTO: 0 10*3/UL (ref 0–0.1)
BASOPHILS # BLD AUTO: 0 10*3/UL (ref 0–0.1)
BASOPHILS NFR BLD AUTO: 0.3 % (ref 0–1)
BASOPHILS NFR BLD AUTO: 0.5 % (ref 0–1)
BUN SERPL-MCNC: 23 MG/DL (ref 7–24)
CHLORIDE SERPL-SCNC: 108 MMOL/L (ref 98–107)
CREAT SERPL-MCNC: 0.88 MG/DL (ref 0.55–1.02)
EOSINOPHIL # BLD AUTO: 0.1 10*3/UL (ref 0–0.4)
EOSINOPHIL # BLD AUTO: 0.2 10*3/UL (ref 0–0.4)
EOSINOPHIL # BLD AUTO: 2 % (ref 1–4)
EOSINOPHIL # BLD AUTO: 2.1 % (ref 1–4)
ERYTHROCYTE [DISTWIDTH] IN BLOOD BY AUTOMATED COUNT: 15 % (ref 0–14.5)
ERYTHROCYTE [DISTWIDTH] IN BLOOD BY AUTOMATED COUNT: 15 % (ref 0–14.5)
HCT VFR BLD AUTO: 34.8 % (ref 37–47)
HCT VFR BLD AUTO: 35.5 % (ref 37–47)
INR BLD: 1.1 (ref 2–3.5)
LYMPHOCYTES # BLD AUTO: 0.9 10*3/UL (ref 1.3–4.4)
LYMPHOCYTES # BLD AUTO: 1.9 10*3/UL (ref 1.3–4.4)
LYMPHOCYTES NFR BLD AUTO: 13.9 % (ref 27–41)
LYMPHOCYTES NFR BLD AUTO: 24 % (ref 27–41)
MCH RBC QN AUTO: 31.1 PG (ref 27–31)
MCH RBC QN AUTO: 31.3 PG (ref 27–31)
MCHC RBC AUTO-ENTMCNC: 30.1 G/DL (ref 33–37)
MCHC RBC AUTO-ENTMCNC: 30.2 G/DL (ref 33–37)
MCV RBC AUTO: 103.2 FL (ref 81–99)
MCV RBC AUTO: 103.9 FL (ref 81–99)
MONOCYTES # BLD AUTO: 0.4 10*3/UL (ref 0.1–1)
MONOCYTES # BLD AUTO: 0.8 10*3/UL (ref 0.1–1)
MONOCYTES NFR BLD MANUAL: 6.7 % (ref 3–9)
MONOCYTES NFR BLD MANUAL: 9.9 % (ref 3–9)
NEUT #: 4.9 10*3/UL (ref 2.3–7.9)
NEUT #: 5 10*3/UL (ref 2.3–7.9)
NEUT %: 63.1 % (ref 47–73)
NEUT %: 76.8 % (ref 47–73)
NRBC BLD QL AUTO: 0 10*3/UL (ref 0–0)
NRBC BLD QL AUTO: 0 10*3/UL (ref 0–0)
PLATELET # BLD AUTO: 198 10*3/UL (ref 130–400)
PLATELET # BLD AUTO: 229 10*3/UL (ref 130–400)
PMV BLD AUTO: 9.2 FL (ref 9.6–12.3)
PMV BLD AUTO: 9.3 FL (ref 9.6–12.3)
POTASSIUM SERPL-SCNC: 4.3 MMOL/L (ref 3.5–5.1)
PROT SERPL-MCNC: 7.1 GM/DL (ref 6.4–8.2)
RBC # BLD AUTO: 3.35 10*6/UL (ref 4.1–5.1)
RBC # BLD AUTO: 3.44 10*6/UL (ref 4.1–5.1)
SODIUM SERPL-SCNC: 143 MMOL/L (ref 136–145)
WBC NRBC COR # BLD AUTO: 6.4 10*3/UL (ref 4.8–10.8)
WBC NRBC COR # BLD AUTO: 8 10*3/UL (ref 4.8–10.8)

## 2021-12-18 ENCOUNTER — HOSPITAL ENCOUNTER (EMERGENCY)
Dept: HOSPITAL 83 - ED | Age: 81
Discharge: NURSING, REHAB OR LONG-TERM CARE | End: 2021-12-18
Payer: MEDICARE

## 2021-12-18 VITALS — WEIGHT: 172 LBS | HEIGHT: 55 IN

## 2021-12-18 DIAGNOSIS — Z88.7: ICD-10-CM

## 2021-12-18 DIAGNOSIS — Z91.040: ICD-10-CM
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
## 2021-12-18 DIAGNOSIS — S01.01XA: Primary | ICD-10-CM
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
## 2021-12-18 DIAGNOSIS — M54.2: ICD-10-CM

## 2021-12-18 DIAGNOSIS — Z79.899: ICD-10-CM
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
## 2021-12-18 DIAGNOSIS — F17.200: ICD-10-CM
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
## 2021-12-18 LAB
BASOPHILS # BLD AUTO: 0 10*3/UL (ref 0–0.1)
BASOPHILS NFR BLD AUTO: 0.4 % (ref 0–1)
BUN SERPL-MCNC: 26 MG/DL (ref 7–24)
CHLORIDE SERPL-SCNC: 109 MMOL/L (ref 98–107)
CREAT SERPL-MCNC: 1.01 MG/DL (ref 0.55–1.02)
EOSINOPHIL # BLD AUTO: 0.1 10*3/UL (ref 0–0.4)
EOSINOPHIL # BLD AUTO: 1.5 % (ref 1–4)
ERYTHROCYTE [DISTWIDTH] IN BLOOD BY AUTOMATED COUNT: 12.9 % (ref 0–14.5)
HCT VFR BLD AUTO: 41.2 % (ref 37–47)
LYMPHOCYTES # BLD AUTO: 1 10*3/UL (ref 1.3–4.4)
LYMPHOCYTES NFR BLD AUTO: 13.2 % (ref 27–41)
MCH RBC QN AUTO: 30.9 PG (ref 27–31)
MCHC RBC AUTO-ENTMCNC: 30.3 G/DL (ref 33–37)
MCV RBC AUTO: 102 FL (ref 81–99)
MONOCYTES # BLD AUTO: 0.5 10*3/UL (ref 0.1–1)
MONOCYTES NFR BLD MANUAL: 6.9 % (ref 3–9)
NEUT #: 5.8 10*3/UL (ref 2.3–7.9)
NEUT %: 77.5 % (ref 47–73)
NRBC BLD QL AUTO: 0 10*3/UL (ref 0–0)
PLATELET # BLD AUTO: 197 10*3/UL (ref 130–400)
PMV BLD AUTO: 9.5 FL (ref 9.6–12.3)
POTASSIUM SERPL-SCNC: 5 MMOL/L (ref 3.5–5.1)
RBC # BLD AUTO: 4.04 10*6/UL (ref 4.1–5.1)
SODIUM SERPL-SCNC: 142 MMOL/L (ref 136–145)
WBC NRBC COR # BLD AUTO: 7.5 10*3/UL (ref 4.8–10.8)

## 2022-04-28 ENCOUNTER — HOSPITAL ENCOUNTER (EMERGENCY)
Dept: HOSPITAL 83 - ED | Age: 82
Discharge: NURSING, REHAB OR LONG-TERM CARE | End: 2022-04-28
Payer: MEDICARE

## 2022-04-28 VITALS — WEIGHT: 163 LBS | HEIGHT: 55 IN

## 2022-04-28 DIAGNOSIS — Z88.7: ICD-10-CM

## 2022-04-28 DIAGNOSIS — Z91.040: ICD-10-CM

## 2022-04-28 DIAGNOSIS — R09.89: ICD-10-CM

## 2022-04-28 DIAGNOSIS — Z87.891: ICD-10-CM

## 2022-04-28 DIAGNOSIS — Z98.890: ICD-10-CM

## 2022-04-28 DIAGNOSIS — Z90.49: ICD-10-CM

## 2022-04-28 DIAGNOSIS — Z90.710: ICD-10-CM

## 2022-04-28 DIAGNOSIS — Z90.89: ICD-10-CM

## 2022-04-28 DIAGNOSIS — Z79.899: ICD-10-CM

## 2022-04-28 DIAGNOSIS — N17.9: ICD-10-CM

## 2022-04-28 DIAGNOSIS — R06.02: Primary | ICD-10-CM

## 2022-04-28 LAB
ALP SERPL-CCNC: 73 U/L (ref 45–117)
ALT SERPL W P-5'-P-CCNC: 17 U/L (ref 12–78)
APTT PPP: 27.2 SECONDS (ref 20–32.1)
AST SERPL-CCNC: 15 IU/L (ref 3–35)
BASOPHILS # BLD AUTO: 0 10*3/UL (ref 0–0.1)
BASOPHILS NFR BLD AUTO: 0.5 % (ref 0–1)
BUN SERPL-MCNC: 30 MG/DL (ref 7–24)
CHLORIDE SERPL-SCNC: 111 MMOL/L (ref 98–107)
CREAT SERPL-MCNC: 1.47 MG/DL (ref 0.55–1.02)
EOSINOPHIL # BLD AUTO: 0.2 10*3/UL (ref 0–0.4)
EOSINOPHIL # BLD AUTO: 2.1 % (ref 1–4)
ERYTHROCYTE [DISTWIDTH] IN BLOOD BY AUTOMATED COUNT: 14.1 % (ref 0–14.5)
HCT VFR BLD AUTO: 40.4 % (ref 37–47)
INR BLD: 1 (ref 2–3.5)
LIPASE SERPL-CCNC: 142 U/L (ref 73–393)
LYMPHOCYTES # BLD AUTO: 1.6 10*3/UL (ref 1.3–4.4)
LYMPHOCYTES NFR BLD AUTO: 19 % (ref 27–41)
MCH RBC QN AUTO: 30.9 PG (ref 27–31)
MCHC RBC AUTO-ENTMCNC: 30.4 G/DL (ref 33–37)
MCV RBC AUTO: 101.5 FL (ref 81–99)
MONOCYTES # BLD AUTO: 0.8 10*3/UL (ref 0.1–1)
MONOCYTES NFR BLD MANUAL: 9.2 % (ref 3–9)
NEUT #: 5.8 10*3/UL (ref 2.3–7.9)
NEUT %: 68.7 % (ref 47–73)
NRBC BLD QL AUTO: 0 % (ref 0–0)
PLATELET # BLD AUTO: 226 10*3/UL (ref 130–400)
PMV BLD AUTO: 9.7 FL (ref 9.6–12.3)
POTASSIUM SERPL-SCNC: 4.3 MMOL/L (ref 3.5–5.1)
PROT SERPL-MCNC: 7.1 GM/DL (ref 6.4–8.2)
RBC # BLD AUTO: 3.98 10*6/UL (ref 4.1–5.1)
SODIUM SERPL-SCNC: 144 MMOL/L (ref 136–145)
WBC NRBC COR # BLD AUTO: 8.5 10*3/UL (ref 4.8–10.8)

## 2022-05-11 ENCOUNTER — HOSPITAL ENCOUNTER (OUTPATIENT)
Dept: HOSPITAL 83 - CT | Age: 82
Discharge: HOME | End: 2022-05-11
Attending: INTERNAL MEDICINE
Payer: MEDICARE

## 2022-05-11 DIAGNOSIS — I63.81: Primary | ICD-10-CM

## 2022-05-11 DIAGNOSIS — H53.8: ICD-10-CM

## 2022-05-11 DIAGNOSIS — H53.2: ICD-10-CM

## 2022-05-31 ENCOUNTER — HOSPITAL ENCOUNTER (EMERGENCY)
Dept: HOSPITAL 83 - ED | Age: 82
Discharge: NURSING, REHAB OR LONG-TERM CARE | End: 2022-05-31
Payer: MEDICARE

## 2022-05-31 VITALS — HEIGHT: 55 IN

## 2022-05-31 DIAGNOSIS — Z90.710: ICD-10-CM

## 2022-05-31 DIAGNOSIS — Z91.040: ICD-10-CM

## 2022-05-31 DIAGNOSIS — Z98.890: ICD-10-CM

## 2022-05-31 DIAGNOSIS — Z79.899: ICD-10-CM

## 2022-05-31 DIAGNOSIS — J44.9: Primary | ICD-10-CM

## 2022-05-31 DIAGNOSIS — Z90.89: ICD-10-CM

## 2022-05-31 DIAGNOSIS — Z88.7: ICD-10-CM

## 2022-05-31 DIAGNOSIS — Z87.891: ICD-10-CM

## 2022-05-31 DIAGNOSIS — Z90.49: ICD-10-CM

## 2022-05-31 LAB
ALP SERPL-CCNC: 67 U/L (ref 45–117)
ALT SERPL W P-5'-P-CCNC: 17 U/L (ref 12–78)
APTT PPP: 26.8 SECONDS (ref 20–32.1)
AST SERPL-CCNC: 13 IU/L (ref 3–35)
BASOPHILS # BLD AUTO: 0 10*3/UL (ref 0–0.1)
BASOPHILS NFR BLD AUTO: 0.3 % (ref 0–1)
BUN SERPL-MCNC: 23 MG/DL (ref 7–24)
CHLORIDE SERPL-SCNC: 109 MMOL/L (ref 98–107)
CREAT SERPL-MCNC: 0.85 MG/DL (ref 0.55–1.02)
EOSINOPHIL # BLD AUTO: 0.1 10*3/UL (ref 0–0.4)
EOSINOPHIL # BLD AUTO: 1.5 % (ref 1–4)
ERYTHROCYTE [DISTWIDTH] IN BLOOD BY AUTOMATED COUNT: 13.9 % (ref 0–14.5)
HCT VFR BLD AUTO: 43.5 % (ref 37–47)
INR BLD: 1 (ref 2–3.5)
LIPASE SERPL-CCNC: 93 U/L (ref 73–393)
LYMPHOCYTES # BLD AUTO: 1.6 10*3/UL (ref 1.3–4.4)
LYMPHOCYTES NFR BLD AUTO: 20.4 % (ref 27–41)
MCH RBC QN AUTO: 30.5 PG (ref 27–31)
MCHC RBC AUTO-ENTMCNC: 30.1 G/DL (ref 33–37)
MCV RBC AUTO: 101.2 FL (ref 81–99)
MONOCYTES # BLD AUTO: 0.7 10*3/UL (ref 0.1–1)
MONOCYTES NFR BLD MANUAL: 8.6 % (ref 3–9)
NEUT #: 5.4 10*3/UL (ref 2.3–7.9)
NEUT %: 68.8 % (ref 47–73)
NRBC BLD QL AUTO: 0 10*3/UL (ref 0–0)
PLATELET # BLD AUTO: 197 10*3/UL (ref 130–400)
PMV BLD AUTO: 9.5 FL (ref 9.6–12.3)
POTASSIUM SERPL-SCNC: 4 MMOL/L (ref 3.5–5.1)
PROT SERPL-MCNC: 7 GM/DL (ref 6.4–8.2)
RBC # BLD AUTO: 4.3 10*6/UL (ref 4.1–5.1)
SODIUM SERPL-SCNC: 144 MMOL/L (ref 136–145)
WBC NRBC COR # BLD AUTO: 7.8 10*3/UL (ref 4.8–10.8)

## 2022-09-27 ENCOUNTER — HOSPITAL ENCOUNTER (EMERGENCY)
Dept: HOSPITAL 83 - ED | Age: 82
End: 2022-09-27
Payer: MEDICARE

## 2022-09-27 VITALS — WEIGHT: 178.19 LBS | HEIGHT: 55 IN

## 2022-09-27 DIAGNOSIS — I46.9: Primary | ICD-10-CM

## 2022-09-27 DIAGNOSIS — K92.2: ICD-10-CM

## 2022-09-27 DIAGNOSIS — D62: ICD-10-CM

## 2022-09-27 LAB
ALP SERPL-CCNC: 81 U/L (ref 45–117)
AST SERPL-CCNC: 2497 IU/L (ref 3–35)
BASOPHILS # BLD AUTO: 1 % (ref 0–1)
BUN SERPL-MCNC: 115 MG/DL (ref 7–24)
BURR CELLS BLD QL SMEAR: (no result)
CHLORIDE SERPL-SCNC: 114 MMOL/L (ref 98–107)
CREAT SERPL-MCNC: 2.03 MG/DL (ref 0.55–1.02)
ERYTHROCYTE [DISTWIDTH] IN BLOOD BY AUTOMATED COUNT: 13.7 % (ref 0–14.5)
HCT VFR BLD AUTO: 17.4 % (ref 37–47)
LIPASE SERPL-CCNC: 201 U/L (ref 73–393)
MANUAL DIFFERENTIAL PERFORMED BLD QL: YES
MCH RBC QN AUTO: 31.3 PG (ref 27–31)
MCHC RBC AUTO-ENTMCNC: 26.4 G/DL (ref 33–37)
MCV RBC AUTO: 118.4 FL (ref 81–99)
NRBC BLD QL AUTO: 0.2 10*3/UL (ref 0–0)
PLATELET # BLD AUTO: 151 10*3/UL (ref 130–400)
PLATELET SUFFICIENCY: NORMAL
PMV BLD AUTO: 11.3 FL (ref 9.6–12.3)
POTASSIUM SERPL-SCNC: 6.6 MMOL/L (ref 3.5–5.1)
PROT SERPL-MCNC: 4.2 GM/DL (ref 6.4–8.2)
RBC # BLD AUTO: 1.47 10*6/UL (ref 4.1–5.1)
SODIUM SERPL-SCNC: 146 MMOL/L (ref 136–145)
TOTAL CELLS COUNTED: 100 #CELLS
VARIANT LYMPHS NFR BLD MANUAL: 2 % (ref 0–0)
WBC NRBC COR # BLD AUTO: 11.9 10*3/UL (ref 4.8–10.8)